# Patient Record
Sex: FEMALE | Race: WHITE | NOT HISPANIC OR LATINO | URBAN - METROPOLITAN AREA
[De-identification: names, ages, dates, MRNs, and addresses within clinical notes are randomized per-mention and may not be internally consistent; named-entity substitution may affect disease eponyms.]

---

## 2017-01-16 ENCOUNTER — ON CAMPUS - OUTPATIENT (AMBULATORY)
Dept: URBAN - METROPOLITAN AREA HOSPITAL 85 | Facility: HOSPITAL | Age: 42
End: 2017-01-16

## 2017-01-16 ENCOUNTER — HOSPITAL ENCOUNTER (OUTPATIENT)
Dept: PREOP | Facility: HOSPITAL | Age: 42
Setting detail: HOSPITAL OUTPATIENT SURGERY
Discharge: HOME | End: 2017-01-16
Attending: INTERNAL MEDICINE | Admitting: INTERNAL MEDICINE

## 2017-01-16 DIAGNOSIS — K52.9 NONINFECTIVE GASTROENTERITIS AND COLITIS, UNSPECIFIED: ICD-10-CM

## 2017-01-16 PROCEDURE — 45378 DIAGNOSTIC COLONOSCOPY: CPT | Performed by: INTERNAL MEDICINE

## 2017-03-01 ENCOUNTER — OFFICE (AMBULATORY)
Dept: URBAN - METROPOLITAN AREA CLINIC 64 | Facility: CLINIC | Age: 42
End: 2017-03-01

## 2017-03-01 VITALS — SYSTOLIC BLOOD PRESSURE: 91 MMHG | HEART RATE: 88 BPM | WEIGHT: 130 LBS | DIASTOLIC BLOOD PRESSURE: 53 MMHG

## 2017-03-01 DIAGNOSIS — K58.9 IRRITABLE BOWEL SYNDROME WITHOUT DIARRHEA: ICD-10-CM

## 2017-03-01 PROCEDURE — 99214 OFFICE O/P EST MOD 30 MIN: CPT | Performed by: NURSE PRACTITIONER

## 2017-04-24 ENCOUNTER — OFFICE (AMBULATORY)
Dept: URBAN - METROPOLITAN AREA CLINIC 64 | Facility: CLINIC | Age: 42
End: 2017-04-24

## 2017-04-24 VITALS — WEIGHT: 129 LBS | HEART RATE: 82 BPM | SYSTOLIC BLOOD PRESSURE: 100 MMHG | DIASTOLIC BLOOD PRESSURE: 61 MMHG

## 2017-04-24 DIAGNOSIS — R10.84 GENERALIZED ABDOMINAL PAIN: ICD-10-CM

## 2017-04-24 DIAGNOSIS — K59.00 CONSTIPATION, UNSPECIFIED: ICD-10-CM

## 2017-04-24 DIAGNOSIS — K58.9 IRRITABLE BOWEL SYNDROME WITHOUT DIARRHEA: ICD-10-CM

## 2017-04-24 PROCEDURE — 99213 OFFICE O/P EST LOW 20 MIN: CPT | Performed by: NURSE PRACTITIONER

## 2017-04-24 RX ORDER — SORBITOL SOLUTION 70 %
SOLUTION, ORAL MISCELLANEOUS
Qty: 100 | Refills: 0 | Status: COMPLETED
Start: 2017-04-24 | End: 2017-06-09

## 2017-04-24 RX ORDER — LINACLOTIDE 290 UG/1
290 CAPSULE, GELATIN COATED ORAL
Qty: 90 | Refills: 3 | Status: COMPLETED
Start: 2017-04-24 | End: 2017-06-09

## 2017-05-20 ENCOUNTER — INPATIENT HOSPITAL (AMBULATORY)
Dept: URBAN - METROPOLITAN AREA HOSPITAL 84 | Facility: HOSPITAL | Age: 42
End: 2017-05-20

## 2017-05-20 DIAGNOSIS — D64.9 ANEMIA, UNSPECIFIED: ICD-10-CM

## 2017-05-20 DIAGNOSIS — K52.9 NONINFECTIVE GASTROENTERITIS AND COLITIS, UNSPECIFIED: ICD-10-CM

## 2017-05-20 DIAGNOSIS — D72.829 ELEVATED WHITE BLOOD CELL COUNT, UNSPECIFIED: ICD-10-CM

## 2017-05-20 PROCEDURE — 99254 IP/OBS CNSLTJ NEW/EST MOD 60: CPT | Performed by: INTERNAL MEDICINE

## 2017-05-21 PROCEDURE — 99232 SBSQ HOSP IP/OBS MODERATE 35: CPT | Performed by: INTERNAL MEDICINE

## 2017-05-22 ENCOUNTER — INPATIENT HOSPITAL (AMBULATORY)
Dept: URBAN - METROPOLITAN AREA HOSPITAL 84 | Facility: HOSPITAL | Age: 42
End: 2017-05-22

## 2017-05-22 DIAGNOSIS — D72.829 ELEVATED WHITE BLOOD CELL COUNT, UNSPECIFIED: ICD-10-CM

## 2017-05-22 DIAGNOSIS — D64.9 ANEMIA, UNSPECIFIED: ICD-10-CM

## 2017-05-22 DIAGNOSIS — K52.9 NONINFECTIVE GASTROENTERITIS AND COLITIS, UNSPECIFIED: ICD-10-CM

## 2017-05-22 PROCEDURE — 99232 SBSQ HOSP IP/OBS MODERATE 35: CPT | Performed by: INTERNAL MEDICINE

## 2017-05-23 PROCEDURE — 99232 SBSQ HOSP IP/OBS MODERATE 35: CPT | Performed by: INTERNAL MEDICINE

## 2017-06-09 ENCOUNTER — OFFICE (AMBULATORY)
Dept: URBAN - METROPOLITAN AREA CLINIC 64 | Facility: CLINIC | Age: 42
End: 2017-06-09

## 2017-06-09 VITALS — DIASTOLIC BLOOD PRESSURE: 50 MMHG | WEIGHT: 128 LBS | HEART RATE: 92 BPM | SYSTOLIC BLOOD PRESSURE: 96 MMHG

## 2017-06-09 DIAGNOSIS — R19.7 DIARRHEA, UNSPECIFIED: ICD-10-CM

## 2017-06-09 DIAGNOSIS — R10.84 GENERALIZED ABDOMINAL PAIN: ICD-10-CM

## 2017-06-09 DIAGNOSIS — K52.9 NONINFECTIVE GASTROENTERITIS AND COLITIS, UNSPECIFIED: ICD-10-CM

## 2017-06-09 PROCEDURE — 99213 OFFICE O/P EST LOW 20 MIN: CPT | Performed by: NURSE PRACTITIONER

## 2017-07-20 ENCOUNTER — OFFICE (AMBULATORY)
Dept: URBAN - METROPOLITAN AREA CLINIC 64 | Facility: CLINIC | Age: 42
End: 2017-07-20

## 2017-07-20 VITALS — DIASTOLIC BLOOD PRESSURE: 67 MMHG | WEIGHT: 130 LBS | SYSTOLIC BLOOD PRESSURE: 98 MMHG | HEART RATE: 87 BPM

## 2017-07-20 DIAGNOSIS — K62.5 HEMORRHAGE OF ANUS AND RECTUM: ICD-10-CM

## 2017-07-20 DIAGNOSIS — R10.84 GENERALIZED ABDOMINAL PAIN: ICD-10-CM

## 2017-07-20 DIAGNOSIS — K52.9 NONINFECTIVE GASTROENTERITIS AND COLITIS, UNSPECIFIED: ICD-10-CM

## 2017-07-20 PROCEDURE — 99214 OFFICE O/P EST MOD 30 MIN: CPT | Performed by: NURSE PRACTITIONER

## 2017-07-20 RX ORDER — METRONIDAZOLE 500 MG/1
1500 TABLET, FILM COATED ORAL
Qty: 30 | Refills: 0 | Status: COMPLETED
Start: 2017-07-20 | End: 2017-10-19

## 2017-07-20 RX ORDER — MESALAMINE 250 MG/1
CAPSULE ORAL
Qty: 120 | Refills: 0 | Status: COMPLETED
Start: 2017-07-20 | End: 2017-10-19

## 2017-07-20 RX ORDER — HYDROCORTISONE ACETATE AND PRAMOXINE HYDROCHLORIDE 25; 10 MG/G; MG/G
CREAM TOPICAL
Qty: 1 | Refills: 2 | Status: COMPLETED
Start: 2017-07-20 | End: 2017-10-19

## 2017-10-19 ENCOUNTER — OFFICE (AMBULATORY)
Dept: URBAN - METROPOLITAN AREA CLINIC 64 | Facility: CLINIC | Age: 42
End: 2017-10-19

## 2017-10-19 ENCOUNTER — ON CAMPUS - OUTPATIENT (AMBULATORY)
Dept: URBAN - METROPOLITAN AREA HOSPITAL 2 | Facility: HOSPITAL | Age: 42
End: 2017-10-19

## 2017-10-19 VITALS
SYSTOLIC BLOOD PRESSURE: 82 MMHG | DIASTOLIC BLOOD PRESSURE: 71 MMHG | HEART RATE: 88 BPM | HEART RATE: 86 BPM | SYSTOLIC BLOOD PRESSURE: 78 MMHG | SYSTOLIC BLOOD PRESSURE: 105 MMHG | DIASTOLIC BLOOD PRESSURE: 119 MMHG | SYSTOLIC BLOOD PRESSURE: 84 MMHG | HEART RATE: 102 BPM | RESPIRATION RATE: 17 BRPM | RESPIRATION RATE: 20 BRPM | HEART RATE: 92 BPM | DIASTOLIC BLOOD PRESSURE: 69 MMHG | SYSTOLIC BLOOD PRESSURE: 79 MMHG | SYSTOLIC BLOOD PRESSURE: 133 MMHG | HEART RATE: 83 BPM | SYSTOLIC BLOOD PRESSURE: 99 MMHG | SYSTOLIC BLOOD PRESSURE: 90 MMHG | SYSTOLIC BLOOD PRESSURE: 111 MMHG | HEART RATE: 90 BPM | OXYGEN SATURATION: 100 % | HEART RATE: 84 BPM | HEART RATE: 78 BPM | DIASTOLIC BLOOD PRESSURE: 55 MMHG | DIASTOLIC BLOOD PRESSURE: 63 MMHG | RESPIRATION RATE: 18 BRPM | WEIGHT: 128 LBS | DIASTOLIC BLOOD PRESSURE: 60 MMHG | HEART RATE: 91 BPM | RESPIRATION RATE: 16 BRPM | OXYGEN SATURATION: 97 % | DIASTOLIC BLOOD PRESSURE: 58 MMHG | OXYGEN SATURATION: 99 % | TEMPERATURE: 97.9 F | DIASTOLIC BLOOD PRESSURE: 57 MMHG | DIASTOLIC BLOOD PRESSURE: 51 MMHG

## 2017-10-19 DIAGNOSIS — K52.9 NONINFECTIVE GASTROENTERITIS AND COLITIS, UNSPECIFIED: ICD-10-CM

## 2017-10-19 DIAGNOSIS — K62.89 OTHER SPECIFIED DISEASES OF ANUS AND RECTUM: ICD-10-CM

## 2017-10-19 DIAGNOSIS — K62.5 HEMORRHAGE OF ANUS AND RECTUM: ICD-10-CM

## 2017-10-19 DIAGNOSIS — R19.7 DIARRHEA, UNSPECIFIED: ICD-10-CM

## 2017-10-19 LAB
GI HISTOLOGY: A. UNSPECIFIED: (no result)
GI HISTOLOGY: PDF REPORT: (no result)

## 2017-10-19 PROCEDURE — 88305 TISSUE EXAM BY PATHOLOGIST: CPT | Performed by: INTERNAL MEDICINE

## 2017-10-19 PROCEDURE — 45380 COLONOSCOPY AND BIOPSY: CPT | Performed by: INTERNAL MEDICINE

## 2017-10-19 RX ADMIN — PROPOFOL: 10 INJECTION, EMULSION INTRAVENOUS at 15:04

## 2018-08-01 ENCOUNTER — OFFICE (AMBULATORY)
Dept: URBAN - METROPOLITAN AREA CLINIC 64 | Facility: CLINIC | Age: 43
End: 2018-08-01

## 2018-08-01 VITALS — HEART RATE: 80 BPM | SYSTOLIC BLOOD PRESSURE: 89 MMHG | DIASTOLIC BLOOD PRESSURE: 64 MMHG | WEIGHT: 134 LBS

## 2018-08-01 DIAGNOSIS — K58.9 IRRITABLE BOWEL SYNDROME WITHOUT DIARRHEA: ICD-10-CM

## 2018-08-01 DIAGNOSIS — K59.00 CONSTIPATION, UNSPECIFIED: ICD-10-CM

## 2018-08-01 DIAGNOSIS — R10.84 GENERALIZED ABDOMINAL PAIN: ICD-10-CM

## 2018-08-01 PROCEDURE — 99213 OFFICE O/P EST LOW 20 MIN: CPT | Performed by: NURSE PRACTITIONER

## 2018-08-01 RX ORDER — SORBITOL SOLUTION 70 %
SOLUTION, ORAL MISCELLANEOUS
Qty: 100 | Refills: 0 | Status: COMPLETED
Start: 2018-08-01 | End: 2019-04-09

## 2018-08-01 RX ORDER — PLECANATIDE 3 MG/1
3 TABLET ORAL
Qty: 90 | Refills: 3 | Status: COMPLETED
Start: 2018-08-01 | End: 2019-04-09

## 2019-04-09 ENCOUNTER — OFFICE (AMBULATORY)
Dept: URBAN - METROPOLITAN AREA CLINIC 64 | Facility: CLINIC | Age: 44
End: 2019-04-09

## 2019-04-09 VITALS — SYSTOLIC BLOOD PRESSURE: 107 MMHG | WEIGHT: 139 LBS | HEART RATE: 89 BPM | DIASTOLIC BLOOD PRESSURE: 72 MMHG

## 2019-04-09 DIAGNOSIS — R10.84 GENERALIZED ABDOMINAL PAIN: ICD-10-CM

## 2019-04-09 DIAGNOSIS — K58.9 IRRITABLE BOWEL SYNDROME WITHOUT DIARRHEA: ICD-10-CM

## 2019-04-09 DIAGNOSIS — K59.00 CONSTIPATION, UNSPECIFIED: ICD-10-CM

## 2019-04-09 PROCEDURE — 99213 OFFICE O/P EST LOW 20 MIN: CPT | Performed by: NURSE PRACTITIONER

## 2019-04-09 RX ORDER — DICYCLOMINE HYDROCHLORIDE 20 MG/1
80 TABLET ORAL
Qty: 120 | Refills: 12 | Status: COMPLETED
Start: 2019-04-09 | End: 2020-07-28

## 2019-04-09 RX ORDER — LUBIPROSTONE 8 UG/1
16 CAPSULE, GELATIN COATED ORAL
Qty: 180 | Refills: 3 | Status: COMPLETED
Start: 2019-04-09 | End: 2020-07-28

## 2019-04-17 ENCOUNTER — HOSPITAL ENCOUNTER (OUTPATIENT)
Dept: CT IMAGING | Facility: HOSPITAL | Age: 44
Discharge: HOME | End: 2019-04-17
Attending: NURSE PRACTITIONER | Admitting: NURSE PRACTITIONER

## 2019-10-26 ENCOUNTER — ANESTHESIA EVENT (OUTPATIENT)
Dept: PERIOP | Facility: HOSPITAL | Age: 44
End: 2019-10-26

## 2019-10-26 ENCOUNTER — HOSPITAL ENCOUNTER (INPATIENT)
Facility: HOSPITAL | Age: 44
LOS: 8 days | Discharge: HOME | End: 2019-11-03
Attending: INTERNAL MEDICINE | Admitting: SURGERY

## 2019-10-26 ENCOUNTER — APPOINTMENT (OUTPATIENT)
Dept: CT IMAGING | Facility: HOSPITAL | Age: 44
End: 2019-10-26

## 2019-10-26 ENCOUNTER — ANESTHESIA (OUTPATIENT)
Dept: PERIOP | Facility: HOSPITAL | Age: 44
End: 2019-10-26

## 2019-10-26 DIAGNOSIS — K65.1 INTRA-ABDOMINAL ABSCESS (HCC): ICD-10-CM

## 2019-10-26 DIAGNOSIS — R50.9 FEVER IN ADULT: ICD-10-CM

## 2019-10-26 DIAGNOSIS — D72.829 LEUKOCYTOSIS, UNSPECIFIED TYPE: ICD-10-CM

## 2019-10-26 DIAGNOSIS — R10.9 ABDOMINAL PAIN: ICD-10-CM

## 2019-10-26 DIAGNOSIS — R10.84 GENERALIZED ABDOMINAL PAIN: Primary | ICD-10-CM

## 2019-10-26 DIAGNOSIS — D64.9 ANEMIA, UNSPECIFIED TYPE: ICD-10-CM

## 2019-10-26 LAB
ABO GROUP BLD: NORMAL
ALBUMIN SERPL-MCNC: 3.7 G/DL (ref 3.5–5.2)
ALBUMIN/GLOB SERPL: 0.8 G/DL
ALP SERPL-CCNC: 198 U/L (ref 39–117)
ALT SERPL W P-5'-P-CCNC: 36 U/L (ref 1–33)
ANION GAP SERPL CALCULATED.3IONS-SCNC: 11 MMOL/L (ref 5–15)
ANION GAP SERPL CALCULATED.3IONS-SCNC: 13 MMOL/L (ref 5–15)
AST SERPL-CCNC: 48 U/L (ref 1–32)
B-HCG UR QL: NEGATIVE
BASOPHILS # BLD AUTO: 0 10*3/MM3 (ref 0–0.2)
BASOPHILS # BLD AUTO: 0 10*3/MM3 (ref 0–0.2)
BASOPHILS NFR BLD AUTO: 0.1 % (ref 0–1.5)
BASOPHILS NFR BLD AUTO: 0.1 % (ref 0–1.5)
BILIRUB SERPL-MCNC: 1 MG/DL (ref 0.2–1.2)
BILIRUB UR QL STRIP: ABNORMAL
BLD GP AB SCN SERPL QL: NEGATIVE
BUN BLD-MCNC: 11 MG/DL (ref 6–20)
BUN BLD-MCNC: 9 MG/DL (ref 6–20)
BUN/CREAT SERPL: 12.2 (ref 7–25)
BUN/CREAT SERPL: 12.8 (ref 7–25)
CALCIUM SPEC-SCNC: 8.1 MG/DL (ref 8.6–10.5)
CALCIUM SPEC-SCNC: 9.2 MG/DL (ref 8.6–10.5)
CHLORIDE SERPL-SCNC: 101 MMOL/L (ref 98–107)
CHLORIDE SERPL-SCNC: 109 MMOL/L (ref 98–107)
CLARITY UR: CLEAR
CO2 SERPL-SCNC: 20 MMOL/L (ref 22–29)
CO2 SERPL-SCNC: 21 MMOL/L (ref 22–29)
COLOR UR: YELLOW
CREAT BLD-MCNC: 0.74 MG/DL (ref 0.57–1)
CREAT BLD-MCNC: 0.86 MG/DL (ref 0.57–1)
D-LACTATE SERPL-SCNC: 1 MMOL/L (ref 0.5–2)
DEPRECATED RDW RBC AUTO: 45.1 FL (ref 37–54)
DEPRECATED RDW RBC AUTO: 45.5 FL (ref 37–54)
EOSINOPHIL # BLD AUTO: 0 10*3/MM3 (ref 0–0.4)
EOSINOPHIL # BLD AUTO: 0 10*3/MM3 (ref 0–0.4)
EOSINOPHIL NFR BLD AUTO: 0 % (ref 0.3–6.2)
EOSINOPHIL NFR BLD AUTO: 0.1 % (ref 0.3–6.2)
ERYTHROCYTE [DISTWIDTH] IN BLOOD BY AUTOMATED COUNT: 14.9 % (ref 12.3–15.4)
ERYTHROCYTE [DISTWIDTH] IN BLOOD BY AUTOMATED COUNT: 15.1 % (ref 12.3–15.4)
GFR SERPL CREATININE-BSD FRML MDRD: 72 ML/MIN/1.73
GFR SERPL CREATININE-BSD FRML MDRD: 85 ML/MIN/1.73
GLOBULIN UR ELPH-MCNC: 4.4 GM/DL
GLUCOSE BLD-MCNC: 114 MG/DL (ref 65–99)
GLUCOSE BLD-MCNC: 154 MG/DL (ref 65–99)
GLUCOSE UR STRIP-MCNC: NEGATIVE MG/DL
HCT VFR BLD AUTO: 25.4 % (ref 34–46.6)
HCT VFR BLD AUTO: 27.2 % (ref 34–46.6)
HGB BLD-MCNC: 8.3 G/DL (ref 12–15.9)
HGB BLD-MCNC: 9.5 G/DL (ref 12–15.9)
HGB UR QL STRIP.AUTO: NEGATIVE
KETONES UR QL STRIP: NEGATIVE
LEUKOCYTE ESTERASE UR QL STRIP.AUTO: NEGATIVE
LIPASE SERPL-CCNC: 16 U/L (ref 13–60)
LYMPHOCYTES # BLD AUTO: 0.5 10*3/MM3 (ref 0.7–3.1)
LYMPHOCYTES # BLD AUTO: 1.3 10*3/MM3 (ref 0.7–3.1)
LYMPHOCYTES NFR BLD AUTO: 3.8 % (ref 19.6–45.3)
LYMPHOCYTES NFR BLD AUTO: 6.6 % (ref 19.6–45.3)
MCH RBC QN AUTO: 27.9 PG (ref 26.6–33)
MCH RBC QN AUTO: 29.8 PG (ref 26.6–33)
MCHC RBC AUTO-ENTMCNC: 32.6 G/DL (ref 31.5–35.7)
MCHC RBC AUTO-ENTMCNC: 35 G/DL (ref 31.5–35.7)
MCV RBC AUTO: 85.2 FL (ref 79–97)
MCV RBC AUTO: 85.6 FL (ref 79–97)
MONOCYTES # BLD AUTO: 0.7 10*3/MM3 (ref 0.1–0.9)
MONOCYTES # BLD AUTO: 1 10*3/MM3 (ref 0.1–0.9)
MONOCYTES NFR BLD AUTO: 4.9 % (ref 5–12)
MONOCYTES NFR BLD AUTO: 5.1 % (ref 5–12)
NEUTROPHILS # BLD AUTO: 12.8 10*3/MM3 (ref 1.7–7)
NEUTROPHILS # BLD AUTO: 17.1 10*3/MM3 (ref 1.7–7)
NEUTROPHILS NFR BLD AUTO: 88.2 % (ref 42.7–76)
NEUTROPHILS NFR BLD AUTO: 91.1 % (ref 42.7–76)
NITRITE UR QL STRIP: NEGATIVE
NRBC BLD AUTO-RTO: 0 /100 WBC (ref 0–0.2)
NRBC BLD AUTO-RTO: 0.1 /100 WBC (ref 0–0.2)
PH UR STRIP.AUTO: 6.5 [PH] (ref 5–8)
PLATELET # BLD AUTO: 339 10*3/MM3 (ref 140–450)
PLATELET # BLD AUTO: 354 10*3/MM3 (ref 140–450)
PMV BLD AUTO: 7.7 FL (ref 6–12)
PMV BLD AUTO: 8 FL (ref 6–12)
POTASSIUM BLD-SCNC: 2.9 MMOL/L (ref 3.5–5.2)
POTASSIUM BLD-SCNC: 3.5 MMOL/L (ref 3.5–5.2)
PROT SERPL-MCNC: 8.1 G/DL (ref 6–8.5)
PROT UR QL STRIP: ABNORMAL
RBC # BLD AUTO: 2.97 10*6/MM3 (ref 3.77–5.28)
RBC # BLD AUTO: 3.19 10*6/MM3 (ref 3.77–5.28)
RH BLD: POSITIVE
SODIUM BLD-SCNC: 135 MMOL/L (ref 136–145)
SODIUM BLD-SCNC: 140 MMOL/L (ref 136–145)
SP GR UR STRIP: 1.03 (ref 1–1.03)
T&S EXPIRATION DATE: NORMAL
UROBILINOGEN UR QL STRIP: ABNORMAL
WBC NRBC COR # BLD: 14.1 10*3/MM3 (ref 3.4–10.8)
WBC NRBC COR # BLD: 19.4 10*3/MM3 (ref 3.4–10.8)

## 2019-10-26 PROCEDURE — 81025 URINE PREGNANCY TEST: CPT | Performed by: NURSE PRACTITIONER

## 2019-10-26 PROCEDURE — 25010000002 FENTANYL CITRATE (PF) 100 MCG/2ML SOLUTION: Performed by: STUDENT IN AN ORGANIZED HEALTH CARE EDUCATION/TRAINING PROGRAM

## 2019-10-26 PROCEDURE — 25010000002 MORPHINE PER 10 MG: Performed by: NURSE PRACTITIONER

## 2019-10-26 PROCEDURE — G0378 HOSPITAL OBSERVATION PER HR: HCPCS

## 2019-10-26 PROCEDURE — 88305 TISSUE EXAM BY PATHOLOGIST: CPT | Performed by: SURGERY

## 2019-10-26 PROCEDURE — 25010000002 LORAZEPAM PER 2 MG: Performed by: STUDENT IN AN ORGANIZED HEALTH CARE EDUCATION/TRAINING PROGRAM

## 2019-10-26 PROCEDURE — 25010000002 PIPERACILLIN SOD-TAZOBACTAM PER 1 G: Performed by: NURSE PRACTITIONER

## 2019-10-26 PROCEDURE — 58700 REMOVAL OF FALLOPIAN TUBE: CPT | Performed by: PHYSICIAN ASSISTANT

## 2019-10-26 PROCEDURE — 87186 SC STD MICRODIL/AGAR DIL: CPT | Performed by: SURGERY

## 2019-10-26 PROCEDURE — 83690 ASSAY OF LIPASE: CPT | Performed by: NURSE PRACTITIONER

## 2019-10-26 PROCEDURE — 0W9H0ZZ DRAINAGE OF RETROPERITONEUM, OPEN APPROACH: ICD-10-PCS | Performed by: SURGERY

## 2019-10-26 PROCEDURE — 25010000002 ONDANSETRON PER 1 MG: Performed by: NURSE PRACTITIONER

## 2019-10-26 PROCEDURE — 25010000002 ONDANSETRON PER 1 MG: Performed by: INTERNAL MEDICINE

## 2019-10-26 PROCEDURE — 87070 CULTURE OTHR SPECIMN AEROBIC: CPT | Performed by: SURGERY

## 2019-10-26 PROCEDURE — 49020 DRAINAGE ABDOM ABSCESS OPEN: CPT | Performed by: SURGERY

## 2019-10-26 PROCEDURE — 74177 CT ABD & PELVIS W/CONTRAST: CPT

## 2019-10-26 PROCEDURE — 80053 COMPREHEN METABOLIC PANEL: CPT | Performed by: NURSE PRACTITIONER

## 2019-10-26 PROCEDURE — 99223 1ST HOSP IP/OBS HIGH 75: CPT | Performed by: INTERNAL MEDICINE

## 2019-10-26 PROCEDURE — 49020 DRAINAGE ABDOM ABSCESS OPEN: CPT | Performed by: PHYSICIAN ASSISTANT

## 2019-10-26 PROCEDURE — 87075 CULTR BACTERIA EXCEPT BLOOD: CPT | Performed by: SURGERY

## 2019-10-26 PROCEDURE — 0 IOPAMIDOL PER 1 ML: Performed by: NURSE PRACTITIONER

## 2019-10-26 PROCEDURE — 86850 RBC ANTIBODY SCREEN: CPT | Performed by: SURGERY

## 2019-10-26 PROCEDURE — 87040 BLOOD CULTURE FOR BACTERIA: CPT | Performed by: NURSE PRACTITIONER

## 2019-10-26 PROCEDURE — 85025 COMPLETE CBC W/AUTO DIFF WBC: CPT | Performed by: NURSE PRACTITIONER

## 2019-10-26 PROCEDURE — 58700 REMOVAL OF FALLOPIAN TUBE: CPT | Performed by: SURGERY

## 2019-10-26 PROCEDURE — 0UT50ZZ RESECTION OF RIGHT FALLOPIAN TUBE, OPEN APPROACH: ICD-10-PCS | Performed by: SURGERY

## 2019-10-26 PROCEDURE — 81003 URINALYSIS AUTO W/O SCOPE: CPT | Performed by: NURSE PRACTITIONER

## 2019-10-26 PROCEDURE — 25010000002 DEXAMETHASONE PER 1 MG: Performed by: STUDENT IN AN ORGANIZED HEALTH CARE EDUCATION/TRAINING PROGRAM

## 2019-10-26 PROCEDURE — 25010000002 MORPHINE PER 10 MG: Performed by: INTERNAL MEDICINE

## 2019-10-26 PROCEDURE — 99284 EMERGENCY DEPT VISIT MOD MDM: CPT

## 2019-10-26 PROCEDURE — 86901 BLOOD TYPING SEROLOGIC RH(D): CPT | Performed by: SURGERY

## 2019-10-26 PROCEDURE — 85025 COMPLETE CBC W/AUTO DIFF WBC: CPT | Performed by: INTERNAL MEDICINE

## 2019-10-26 PROCEDURE — 86900 BLOOD TYPING SEROLOGIC ABO: CPT

## 2019-10-26 PROCEDURE — 86900 BLOOD TYPING SEROLOGIC ABO: CPT | Performed by: SURGERY

## 2019-10-26 PROCEDURE — 0D9W0ZZ DRAINAGE OF PERITONEUM, OPEN APPROACH: ICD-10-PCS | Performed by: SURGERY

## 2019-10-26 PROCEDURE — 25010000002 PROPOFOL 10 MG/ML EMULSION: Performed by: STUDENT IN AN ORGANIZED HEALTH CARE EDUCATION/TRAINING PROGRAM

## 2019-10-26 PROCEDURE — 83605 ASSAY OF LACTIC ACID: CPT

## 2019-10-26 PROCEDURE — 87205 SMEAR GRAM STAIN: CPT | Performed by: SURGERY

## 2019-10-26 PROCEDURE — 25010000002 SUCCINYLCHOLINE PER 20 MG: Performed by: STUDENT IN AN ORGANIZED HEALTH CARE EDUCATION/TRAINING PROGRAM

## 2019-10-26 PROCEDURE — 80048 BASIC METABOLIC PNL TOTAL CA: CPT | Performed by: INTERNAL MEDICINE

## 2019-10-26 PROCEDURE — 87015 SPECIMEN INFECT AGNT CONCNTJ: CPT | Performed by: SURGERY

## 2019-10-26 PROCEDURE — 25010000002 HYDROMORPHONE PER 4 MG: Performed by: STUDENT IN AN ORGANIZED HEALTH CARE EDUCATION/TRAINING PROGRAM

## 2019-10-26 PROCEDURE — 86901 BLOOD TYPING SEROLOGIC RH(D): CPT

## 2019-10-26 PROCEDURE — 25010000002 KETOROLAC TROMETHAMINE PER 15 MG: Performed by: STUDENT IN AN ORGANIZED HEALTH CARE EDUCATION/TRAINING PROGRAM

## 2019-10-26 PROCEDURE — 25010000002 PIPERACILLIN SOD-TAZOBACTAM PER 1 G: Performed by: INTERNAL MEDICINE

## 2019-10-26 PROCEDURE — 99255 IP/OBS CONSLTJ NEW/EST HI 80: CPT | Performed by: SURGERY

## 2019-10-26 RX ORDER — HYDROCODONE BITARTRATE AND ACETAMINOPHEN 10; 325 MG/1; MG/1
1 TABLET ORAL EVERY 4 HOURS PRN
Status: DISCONTINUED | OUTPATIENT
Start: 2019-10-26 | End: 2019-11-03 | Stop reason: HOSPADM

## 2019-10-26 RX ORDER — ONDANSETRON 2 MG/ML
4 INJECTION INTRAMUSCULAR; INTRAVENOUS EVERY 6 HOURS PRN
Status: DISCONTINUED | OUTPATIENT
Start: 2019-10-26 | End: 2019-11-03 | Stop reason: HOSPADM

## 2019-10-26 RX ORDER — NEOSTIGMINE METHYLSULFATE 5 MG/5 ML
SYRINGE (ML) INTRAVENOUS AS NEEDED
Status: DISCONTINUED | OUTPATIENT
Start: 2019-10-26 | End: 2019-10-26 | Stop reason: SURG

## 2019-10-26 RX ORDER — SODIUM CHLORIDE 0.9 % (FLUSH) 0.9 %
10 SYRINGE (ML) INJECTION AS NEEDED
Status: DISCONTINUED | OUTPATIENT
Start: 2019-10-26 | End: 2019-10-28 | Stop reason: ALTCHOICE

## 2019-10-26 RX ORDER — DEXTROSE AND SODIUM CHLORIDE 5; .45 G/100ML; G/100ML
125 INJECTION, SOLUTION INTRAVENOUS CONTINUOUS
Status: DISCONTINUED | OUTPATIENT
Start: 2019-10-26 | End: 2019-10-28 | Stop reason: ALTCHOICE

## 2019-10-26 RX ORDER — ACETAMINOPHEN 650 MG/1
650 SUPPOSITORY RECTAL EVERY 4 HOURS PRN
Status: DISCONTINUED | OUTPATIENT
Start: 2019-10-26 | End: 2019-11-03 | Stop reason: HOSPADM

## 2019-10-26 RX ORDER — HYDROMORPHONE HCL 110MG/55ML
0.5 PATIENT CONTROLLED ANALGESIA SYRINGE INTRAVENOUS
Status: DISCONTINUED | OUTPATIENT
Start: 2019-10-26 | End: 2019-10-26 | Stop reason: HOSPADM

## 2019-10-26 RX ORDER — HYDROMORPHONE HCL 110MG/55ML
1 PATIENT CONTROLLED ANALGESIA SYRINGE INTRAVENOUS
Status: DISCONTINUED | OUTPATIENT
Start: 2019-10-26 | End: 2019-10-27

## 2019-10-26 RX ORDER — ONDANSETRON 4 MG/1
4 TABLET, FILM COATED ORAL EVERY 6 HOURS PRN
Status: DISCONTINUED | OUTPATIENT
Start: 2019-10-26 | End: 2019-10-26

## 2019-10-26 RX ORDER — ONDANSETRON 2 MG/ML
4 INJECTION INTRAMUSCULAR; INTRAVENOUS EVERY 6 HOURS PRN
Status: DISCONTINUED | OUTPATIENT
Start: 2019-10-26 | End: 2019-10-26

## 2019-10-26 RX ORDER — BISACODYL 5 MG/1
10 TABLET, DELAYED RELEASE ORAL DAILY PRN
Status: DISCONTINUED | OUTPATIENT
Start: 2019-10-26 | End: 2019-11-02

## 2019-10-26 RX ORDER — ACETAMINOPHEN 500 MG
1000 TABLET ORAL ONCE
Status: COMPLETED | OUTPATIENT
Start: 2019-10-26 | End: 2019-10-26

## 2019-10-26 RX ORDER — CHOLECALCIFEROL (VITAMIN D3) 125 MCG
5 CAPSULE ORAL NIGHTLY PRN
Status: DISCONTINUED | OUTPATIENT
Start: 2019-10-26 | End: 2019-11-03 | Stop reason: HOSPADM

## 2019-10-26 RX ORDER — PROMETHAZINE HYDROCHLORIDE 25 MG/ML
12.5 INJECTION, SOLUTION INTRAMUSCULAR; INTRAVENOUS EVERY 6 HOURS PRN
Status: DISCONTINUED | OUTPATIENT
Start: 2019-10-26 | End: 2019-11-03 | Stop reason: HOSPADM

## 2019-10-26 RX ORDER — MORPHINE SULFATE 4 MG/ML
2 INJECTION, SOLUTION INTRAMUSCULAR; INTRAVENOUS
Status: DISCONTINUED | OUTPATIENT
Start: 2019-10-26 | End: 2019-10-26

## 2019-10-26 RX ORDER — SODIUM CHLORIDE 9 MG/ML
100 INJECTION, SOLUTION INTRAVENOUS CONTINUOUS
Status: DISCONTINUED | OUTPATIENT
Start: 2019-10-26 | End: 2019-10-26

## 2019-10-26 RX ORDER — PROPOFOL 10 MG/ML
VIAL (ML) INTRAVENOUS AS NEEDED
Status: DISCONTINUED | OUTPATIENT
Start: 2019-10-26 | End: 2019-10-26 | Stop reason: SURG

## 2019-10-26 RX ORDER — GLYCOPYRROLATE 1 MG/5 ML
SYRINGE (ML) INTRAVENOUS AS NEEDED
Status: DISCONTINUED | OUTPATIENT
Start: 2019-10-26 | End: 2019-10-26 | Stop reason: SURG

## 2019-10-26 RX ORDER — SUCCINYLCHOLINE CHLORIDE 20 MG/ML
INJECTION INTRAMUSCULAR; INTRAVENOUS AS NEEDED
Status: DISCONTINUED | OUTPATIENT
Start: 2019-10-26 | End: 2019-10-26 | Stop reason: SURG

## 2019-10-26 RX ORDER — LORAZEPAM 2 MG/ML
INJECTION INTRAMUSCULAR AS NEEDED
Status: DISCONTINUED | OUTPATIENT
Start: 2019-10-26 | End: 2019-10-26 | Stop reason: SURG

## 2019-10-26 RX ORDER — HYDROCODONE BITARTRATE AND ACETAMINOPHEN 5; 325 MG/1; MG/1
1 TABLET ORAL EVERY 4 HOURS PRN
Status: DISCONTINUED | OUTPATIENT
Start: 2019-10-26 | End: 2019-11-03 | Stop reason: HOSPADM

## 2019-10-26 RX ORDER — ROCURONIUM BROMIDE 10 MG/ML
INJECTION, SOLUTION INTRAVENOUS AS NEEDED
Status: DISCONTINUED | OUTPATIENT
Start: 2019-10-26 | End: 2019-10-26 | Stop reason: SURG

## 2019-10-26 RX ORDER — NALOXONE HCL 0.4 MG/ML
0.1 VIAL (ML) INJECTION
Status: DISCONTINUED | OUTPATIENT
Start: 2019-10-26 | End: 2019-10-27

## 2019-10-26 RX ORDER — NALOXONE HCL 0.4 MG/ML
0.4 VIAL (ML) INJECTION
Status: DISCONTINUED | OUTPATIENT
Start: 2019-10-26 | End: 2019-11-03 | Stop reason: HOSPADM

## 2019-10-26 RX ORDER — MAGNESIUM SULFATE HEPTAHYDRATE 40 MG/ML
4 INJECTION, SOLUTION INTRAVENOUS AS NEEDED
Status: DISCONTINUED | OUTPATIENT
Start: 2019-10-26 | End: 2019-11-01

## 2019-10-26 RX ORDER — FENTANYL CITRATE 50 UG/ML
INJECTION, SOLUTION INTRAMUSCULAR; INTRAVENOUS AS NEEDED
Status: DISCONTINUED | OUTPATIENT
Start: 2019-10-26 | End: 2019-10-26 | Stop reason: SURG

## 2019-10-26 RX ORDER — ACETAMINOPHEN 325 MG/1
650 TABLET ORAL EVERY 4 HOURS PRN
Status: DISCONTINUED | OUTPATIENT
Start: 2019-10-26 | End: 2019-11-03 | Stop reason: HOSPADM

## 2019-10-26 RX ORDER — KETOROLAC TROMETHAMINE 30 MG/ML
INJECTION, SOLUTION INTRAMUSCULAR; INTRAVENOUS AS NEEDED
Status: DISCONTINUED | OUTPATIENT
Start: 2019-10-26 | End: 2019-10-26 | Stop reason: SURG

## 2019-10-26 RX ORDER — FAMOTIDINE 10 MG/ML
20 INJECTION, SOLUTION INTRAVENOUS EVERY 12 HOURS SCHEDULED
Status: DISCONTINUED | OUTPATIENT
Start: 2019-10-26 | End: 2019-11-03 | Stop reason: HOSPADM

## 2019-10-26 RX ORDER — SODIUM CHLORIDE 0.9 % (FLUSH) 0.9 %
10 SYRINGE (ML) INJECTION AS NEEDED
Status: DISCONTINUED | OUTPATIENT
Start: 2019-10-26 | End: 2019-11-03 | Stop reason: HOSPADM

## 2019-10-26 RX ORDER — DEXAMETHASONE SODIUM PHOSPHATE 4 MG/ML
INJECTION, SOLUTION INTRA-ARTICULAR; INTRALESIONAL; INTRAMUSCULAR; INTRAVENOUS; SOFT TISSUE AS NEEDED
Status: DISCONTINUED | OUTPATIENT
Start: 2019-10-26 | End: 2019-10-26 | Stop reason: SURG

## 2019-10-26 RX ORDER — MORPHINE SULFATE 4 MG/ML
4 INJECTION, SOLUTION INTRAMUSCULAR; INTRAVENOUS ONCE
Status: COMPLETED | OUTPATIENT
Start: 2019-10-26 | End: 2019-10-26

## 2019-10-26 RX ORDER — POTASSIUM CHLORIDE 20 MEQ/1
40 TABLET, EXTENDED RELEASE ORAL ONCE
Status: COMPLETED | OUTPATIENT
Start: 2019-10-26 | End: 2019-10-26

## 2019-10-26 RX ORDER — MORPHINE SULFATE 4 MG/ML
4 INJECTION, SOLUTION INTRAMUSCULAR; INTRAVENOUS
Status: DISCONTINUED | OUTPATIENT
Start: 2019-10-26 | End: 2019-10-27

## 2019-10-26 RX ORDER — MAGNESIUM SULFATE HEPTAHYDRATE 40 MG/ML
2 INJECTION, SOLUTION INTRAVENOUS AS NEEDED
Status: DISCONTINUED | OUTPATIENT
Start: 2019-10-26 | End: 2019-11-01

## 2019-10-26 RX ORDER — ONDANSETRON 2 MG/ML
4 INJECTION INTRAMUSCULAR; INTRAVENOUS ONCE
Status: COMPLETED | OUTPATIENT
Start: 2019-10-26 | End: 2019-10-26

## 2019-10-26 RX ORDER — DOCUSATE SODIUM 100 MG/1
100 CAPSULE, LIQUID FILLED ORAL 2 TIMES DAILY PRN
Status: DISCONTINUED | OUTPATIENT
Start: 2019-10-26 | End: 2019-11-03 | Stop reason: HOSPADM

## 2019-10-26 RX ORDER — ACETAMINOPHEN 160 MG/5ML
650 SOLUTION ORAL EVERY 4 HOURS PRN
Status: DISCONTINUED | OUTPATIENT
Start: 2019-10-26 | End: 2019-11-03 | Stop reason: HOSPADM

## 2019-10-26 RX ORDER — PAROXETINE HYDROCHLORIDE 40 MG/1
40 TABLET, FILM COATED ORAL EVERY MORNING
COMMUNITY

## 2019-10-26 RX ORDER — POTASSIUM CHLORIDE 20 MEQ/1
40 TABLET, EXTENDED RELEASE ORAL AS NEEDED
Status: DISCONTINUED | OUTPATIENT
Start: 2019-10-26 | End: 2019-11-03 | Stop reason: HOSPADM

## 2019-10-26 RX ORDER — SODIUM CHLORIDE 0.9 % (FLUSH) 0.9 %
10 SYRINGE (ML) INJECTION EVERY 12 HOURS SCHEDULED
Status: DISCONTINUED | OUTPATIENT
Start: 2019-10-26 | End: 2019-11-03 | Stop reason: HOSPADM

## 2019-10-26 RX ORDER — SODIUM CHLORIDE 0.9 % (FLUSH) 0.9 %
3 SYRINGE (ML) INJECTION EVERY 12 HOURS SCHEDULED
Status: DISCONTINUED | OUTPATIENT
Start: 2019-10-26 | End: 2019-11-03 | Stop reason: HOSPADM

## 2019-10-26 RX ORDER — SODIUM CHLORIDE 0.9 % (FLUSH) 0.9 %
3-10 SYRINGE (ML) INJECTION AS NEEDED
Status: DISCONTINUED | OUTPATIENT
Start: 2019-10-26 | End: 2019-11-03 | Stop reason: HOSPADM

## 2019-10-26 RX ORDER — ONDANSETRON 4 MG/1
4 TABLET, FILM COATED ORAL EVERY 6 HOURS PRN
Status: DISCONTINUED | OUTPATIENT
Start: 2019-10-26 | End: 2019-11-03 | Stop reason: HOSPADM

## 2019-10-26 RX ORDER — HYDROMORPHONE HCL 110MG/55ML
PATIENT CONTROLLED ANALGESIA SYRINGE INTRAVENOUS AS NEEDED
Status: DISCONTINUED | OUTPATIENT
Start: 2019-10-26 | End: 2019-10-26 | Stop reason: SURG

## 2019-10-26 RX ORDER — PAROXETINE HYDROCHLORIDE 20 MG/1
40 TABLET, FILM COATED ORAL DAILY
Status: DISCONTINUED | OUTPATIENT
Start: 2019-10-27 | End: 2019-11-03 | Stop reason: HOSPADM

## 2019-10-26 RX ORDER — POTASSIUM CHLORIDE 1.5 G/1.77G
40 POWDER, FOR SOLUTION ORAL AS NEEDED
Status: DISCONTINUED | OUTPATIENT
Start: 2019-10-26 | End: 2019-11-03 | Stop reason: HOSPADM

## 2019-10-26 RX ADMIN — SODIUM CHLORIDE 1000 ML: 900 INJECTION, SOLUTION INTRAVENOUS at 08:27

## 2019-10-26 RX ADMIN — DEXAMETHASONE SODIUM PHOSPHATE 4 MG: 4 INJECTION, SOLUTION INTRAMUSCULAR; INTRAVENOUS at 18:10

## 2019-10-26 RX ADMIN — FENTANYL CITRATE 50 MCG: 50 INJECTION, SOLUTION INTRAMUSCULAR; INTRAVENOUS at 17:55

## 2019-10-26 RX ADMIN — Medication 10 ML: at 23:02

## 2019-10-26 RX ADMIN — ACETAMINOPHEN 1000 MG: 500 TABLET, FILM COATED ORAL at 08:38

## 2019-10-26 RX ADMIN — MORPHINE SULFATE 4 MG: 4 INJECTION INTRAVENOUS at 08:34

## 2019-10-26 RX ADMIN — POTASSIUM CHLORIDE 40 MEQ: 1500 TABLET, EXTENDED RELEASE ORAL at 12:40

## 2019-10-26 RX ADMIN — ONDANSETRON 4 MG: 2 INJECTION INTRAMUSCULAR; INTRAVENOUS at 08:28

## 2019-10-26 RX ADMIN — SUCCINYLCHOLINE CHLORIDE 130 MG: 20 INJECTION, SOLUTION INTRAMUSCULAR; INTRAVENOUS at 18:00

## 2019-10-26 RX ADMIN — DOXYCYCLINE 100 MG: 100 INJECTION, POWDER, LYOPHILIZED, FOR SOLUTION INTRAVENOUS at 12:41

## 2019-10-26 RX ADMIN — Medication 3 MG: at 18:49

## 2019-10-26 RX ADMIN — LORAZEPAM 1 MG: 2 INJECTION, SOLUTION INTRAMUSCULAR; INTRAVENOUS at 17:45

## 2019-10-26 RX ADMIN — HYDROMORPHONE HYDROCHLORIDE 0.5 MG: 2 INJECTION INTRAMUSCULAR; INTRAVENOUS; SUBCUTANEOUS at 17:54

## 2019-10-26 RX ADMIN — PIPERACILLIN AND TAZOBACTAM 3.38 G: 3; .375 INJECTION, POWDER, LYOPHILIZED, FOR SOLUTION INTRAVENOUS at 15:11

## 2019-10-26 RX ADMIN — IOPAMIDOL 100 ML: 755 INJECTION, SOLUTION INTRAVENOUS at 09:21

## 2019-10-26 RX ADMIN — MORPHINE SULFATE 2 MG: 4 INJECTION INTRAVENOUS at 12:04

## 2019-10-26 RX ADMIN — KETOROLAC TROMETHAMINE 60 MG: 30 INJECTION, SOLUTION INTRAMUSCULAR at 18:49

## 2019-10-26 RX ADMIN — DEXTROSE MONOHYDRATE AND SODIUM CHLORIDE 125 ML/HR: 5; .45 INJECTION, SOLUTION INTRAVENOUS at 23:45

## 2019-10-26 RX ADMIN — SODIUM CHLORIDE 1836 ML: 0.9 INJECTION, SOLUTION INTRAVENOUS at 08:41

## 2019-10-26 RX ADMIN — FAMOTIDINE 20 MG: 10 INJECTION, SOLUTION INTRAVENOUS at 23:00

## 2019-10-26 RX ADMIN — ONDANSETRON 4 MG: 2 INJECTION INTRAMUSCULAR; INTRAVENOUS at 18:49

## 2019-10-26 RX ADMIN — LORAZEPAM 1 MG: 2 INJECTION, SOLUTION INTRAMUSCULAR; INTRAVENOUS at 17:35

## 2019-10-26 RX ADMIN — ROCURONIUM BROMIDE 30 MG: 10 INJECTION, SOLUTION INTRAVENOUS at 18:15

## 2019-10-26 RX ADMIN — POTASSIUM CHLORIDE 40 MEQ: 1500 TABLET, EXTENDED RELEASE ORAL at 09:31

## 2019-10-26 RX ADMIN — Medication 0.6 MG: at 18:49

## 2019-10-26 RX ADMIN — PIPERACILLIN AND TAZOBACTAM 3.38 G: 3; .375 INJECTION, POWDER, LYOPHILIZED, FOR SOLUTION INTRAVENOUS at 22:59

## 2019-10-26 RX ADMIN — MORPHINE SULFATE 2 MG: 4 INJECTION INTRAVENOUS at 15:21

## 2019-10-26 RX ADMIN — PIPERACILLIN AND TAZOBACTAM 3.38 G: 3; .375 INJECTION, POWDER, LYOPHILIZED, FOR SOLUTION INTRAVENOUS at 09:59

## 2019-10-26 RX ADMIN — FAMOTIDINE 20 MG: 10 INJECTION, SOLUTION INTRAVENOUS at 12:41

## 2019-10-26 RX ADMIN — SODIUM CHLORIDE 100 ML/HR: 900 INJECTION, SOLUTION INTRAVENOUS at 11:44

## 2019-10-26 RX ADMIN — Medication 3 ML: at 23:02

## 2019-10-26 RX ADMIN — PROPOFOL 150 MG: 10 INJECTION, EMULSION INTRAVENOUS at 18:00

## 2019-10-26 RX ADMIN — Medication 10 ML: at 11:48

## 2019-10-26 NOTE — ANESTHESIA PROCEDURE NOTES
Airway  Urgency: elective    Date/Time: 10/26/2019 6:01 PM  Airway not difficult    General Information and Staff    Patient location during procedure: OR  Anesthesiologist: David Gipson MD    Indications and Patient Condition  Indications for airway management: CNS depression and airway protection    Preoxygenated: yes  MILS maintained throughout  Mask difficulty assessment: 0 - not attempted    Final Airway Details  Final airway type: endotracheal airway      Successful airway: ETT    Successful intubation technique: direct laryngoscopy  Endotracheal tube insertion site: oral  Blade: Reuben  Blade size: 3  ETT size (mm): 7.5  Cormack-Lehane Classification: grade I - full view of glottis  Placement verified by: chest auscultation, capnometry and palpation of cuff   Measured from: teeth  ETT/EBT  to teeth (cm): 22  Number of attempts at approach: 1  Assessment: lips, teeth, and gum same as pre-op and atraumatic intubation

## 2019-10-26 NOTE — ANESTHESIA PREPROCEDURE EVALUATION
Anesthesia Evaluation     Patient summary reviewed and Nursing notes reviewed   no history of anesthetic complications:  NPO Solid Status: > 8 hours  NPO Liquid Status: > 2 hours           Airway   Mallampati: I  TM distance: >3 FB  Neck ROM: full  No difficulty expected  Dental - normal exam     Pulmonary - normal exam   (+) a smoker Current Abstained day of surgery,   Cardiovascular - negative cardio ROS and normal exam        Neuro/Psych  (+) psychiatric history Depression,     GI/Hepatic/Renal/Endo    (+)  GERD, PUD,      Musculoskeletal (-) negative ROS    Abdominal  - normal exam   Substance History - negative use     OB/GYN negative ob/gyn ROS         Other - negative ROS                       Anesthesia Plan    ASA 2 - emergent     general     intravenous induction   Anesthetic plan, all risks, benefits, and alternatives have been provided, discussed and informed consent has been obtained with: patient.  Use of blood products discussed with patient  Consented to blood products.

## 2019-10-27 ENCOUNTER — APPOINTMENT (OUTPATIENT)
Dept: CT IMAGING | Facility: HOSPITAL | Age: 44
End: 2019-10-27

## 2019-10-27 ENCOUNTER — APPOINTMENT (OUTPATIENT)
Dept: GENERAL RADIOLOGY | Facility: HOSPITAL | Age: 44
End: 2019-10-27

## 2019-10-27 LAB
ANION GAP SERPL CALCULATED.3IONS-SCNC: 9 MMOL/L (ref 5–15)
ARTERIAL PATENCY WRIST A: POSITIVE
ATMOSPHERIC PRESS: ABNORMAL MM[HG]
B PERT DNA SPEC QL NAA+PROBE: NOT DETECTED
BASE EXCESS BLDA CALC-SCNC: -7.4 MMOL/L (ref 0–3)
BASOPHILS # BLD AUTO: 0 10*3/MM3 (ref 0–0.2)
BASOPHILS NFR BLD AUTO: 0.2 % (ref 0–1.5)
BDY SITE: ABNORMAL
BUN BLD-MCNC: 11 MG/DL (ref 6–20)
BUN/CREAT SERPL: 14.7 (ref 7–25)
C PNEUM DNA NPH QL NAA+NON-PROBE: NOT DETECTED
CALCIUM SPEC-SCNC: 7.7 MG/DL (ref 8.6–10.5)
CHLORIDE SERPL-SCNC: 111 MMOL/L (ref 98–107)
CO2 BLDA-SCNC: 17.6 MMOL/L (ref 22–29)
CO2 SERPL-SCNC: 18 MMOL/L (ref 22–29)
CREAT BLD-MCNC: 0.75 MG/DL (ref 0.57–1)
DEPRECATED RDW RBC AUTO: 50.8 FL (ref 37–54)
EOSINOPHIL # BLD AUTO: 0 10*3/MM3 (ref 0–0.4)
EOSINOPHIL NFR BLD AUTO: 0 % (ref 0.3–6.2)
ERYTHROCYTE [DISTWIDTH] IN BLOOD BY AUTOMATED COUNT: 15.9 % (ref 12.3–15.4)
FLUAV H1 2009 PAND RNA NPH QL NAA+PROBE: NOT DETECTED
FLUAV H1 HA GENE NPH QL NAA+PROBE: NOT DETECTED
FLUAV H3 RNA NPH QL NAA+PROBE: NOT DETECTED
FLUAV SUBTYP SPEC NAA+PROBE: NOT DETECTED
FLUBV RNA ISLT QL NAA+PROBE: NOT DETECTED
GFR SERPL CREATININE-BSD FRML MDRD: 84 ML/MIN/1.73
GLUCOSE BLD-MCNC: 176 MG/DL (ref 65–99)
HADV DNA SPEC NAA+PROBE: NOT DETECTED
HCO3 BLDA-SCNC: 16.7 MMOL/L (ref 21–28)
HCOV 229E RNA SPEC QL NAA+PROBE: NOT DETECTED
HCOV HKU1 RNA SPEC QL NAA+PROBE: NOT DETECTED
HCOV NL63 RNA SPEC QL NAA+PROBE: NOT DETECTED
HCOV OC43 RNA SPEC QL NAA+PROBE: NOT DETECTED
HCT VFR BLD AUTO: 24.7 % (ref 34–46.6)
HEMODILUTION: NO
HGB BLD-MCNC: 7.8 G/DL (ref 12–15.9)
HMPV RNA NPH QL NAA+NON-PROBE: NOT DETECTED
HOROWITZ INDEX BLD+IHG-RTO: 36 %
HPIV1 RNA SPEC QL NAA+PROBE: NOT DETECTED
HPIV2 RNA SPEC QL NAA+PROBE: NOT DETECTED
HPIV3 RNA NPH QL NAA+PROBE: NOT DETECTED
HPIV4 P GENE NPH QL NAA+PROBE: NOT DETECTED
L PNEUMO1 AG UR QL IA: NEGATIVE
LYMPHOCYTES # BLD AUTO: 0.9 10*3/MM3 (ref 0.7–3.1)
LYMPHOCYTES NFR BLD AUTO: 5 % (ref 19.6–45.3)
M PNEUMO IGG SER IA-ACNC: NOT DETECTED
MCH RBC QN AUTO: 27.9 PG (ref 26.6–33)
MCHC RBC AUTO-ENTMCNC: 31.5 G/DL (ref 31.5–35.7)
MCV RBC AUTO: 88.4 FL (ref 79–97)
MODALITY: ABNORMAL
MONOCYTES # BLD AUTO: 0.5 10*3/MM3 (ref 0.1–0.9)
MONOCYTES NFR BLD AUTO: 2.9 % (ref 5–12)
NEUTROPHILS # BLD AUTO: 15.7 10*3/MM3 (ref 1.7–7)
NEUTROPHILS NFR BLD AUTO: 91.9 % (ref 42.7–76)
NRBC BLD AUTO-RTO: 0 /100 WBC (ref 0–0.2)
PCO2 BLDA: 29 MM HG (ref 35–48)
PH BLDA: 7.37 PH UNITS (ref 7.35–7.45)
PLATELET # BLD AUTO: 318 10*3/MM3 (ref 140–450)
PMV BLD AUTO: 8.1 FL (ref 6–12)
PO2 BLDA: 57.1 MM HG (ref 83–108)
POTASSIUM BLD-SCNC: 4.1 MMOL/L (ref 3.5–5.2)
RBC # BLD AUTO: 2.79 10*6/MM3 (ref 3.77–5.28)
RHINOVIRUS RNA SPEC NAA+PROBE: NOT DETECTED
RSV RNA NPH QL NAA+NON-PROBE: NOT DETECTED
S PNEUM AG SPEC QL LA: NEGATIVE
SAO2 % BLDCOA: 89 % (ref 94–98)
SODIUM BLD-SCNC: 138 MMOL/L (ref 136–145)
WBC NRBC COR # BLD: 17.1 10*3/MM3 (ref 3.4–10.8)

## 2019-10-27 PROCEDURE — 87591 N.GONORRHOEAE DNA AMP PROB: CPT | Performed by: NURSE PRACTITIONER

## 2019-10-27 PROCEDURE — 0099U HC BIOFIRE FILMARRAY RESP PANEL 1: CPT | Performed by: NURSE PRACTITIONER

## 2019-10-27 PROCEDURE — 99233 SBSQ HOSP IP/OBS HIGH 50: CPT | Performed by: INTERNAL MEDICINE

## 2019-10-27 PROCEDURE — 71045 X-RAY EXAM CHEST 1 VIEW: CPT

## 2019-10-27 PROCEDURE — 71275 CT ANGIOGRAPHY CHEST: CPT

## 2019-10-27 PROCEDURE — 94799 UNLISTED PULMONARY SVC/PX: CPT

## 2019-10-27 PROCEDURE — 25010000002 MORPHINE PER 10 MG: Performed by: SURGERY

## 2019-10-27 PROCEDURE — 87899 AGENT NOS ASSAY W/OPTIC: CPT | Performed by: NURSE PRACTITIONER

## 2019-10-27 PROCEDURE — 82803 BLOOD GASES ANY COMBINATION: CPT

## 2019-10-27 PROCEDURE — 94762 N-INVAS EAR/PLS OXIMTRY CONT: CPT

## 2019-10-27 PROCEDURE — 25010000002 PIPERACILLIN SOD-TAZOBACTAM PER 1 G: Performed by: INTERNAL MEDICINE

## 2019-10-27 PROCEDURE — 80048 BASIC METABOLIC PNL TOTAL CA: CPT | Performed by: INTERNAL MEDICINE

## 2019-10-27 PROCEDURE — 25010000002 ENOXAPARIN PER 10 MG: Performed by: SURGERY

## 2019-10-27 PROCEDURE — 36600 WITHDRAWAL OF ARTERIAL BLOOD: CPT

## 2019-10-27 PROCEDURE — 87491 CHLMYD TRACH DNA AMP PROBE: CPT | Performed by: NURSE PRACTITIONER

## 2019-10-27 PROCEDURE — 85025 COMPLETE CBC W/AUTO DIFF WBC: CPT | Performed by: INTERNAL MEDICINE

## 2019-10-27 PROCEDURE — 99024 POSTOP FOLLOW-UP VISIT: CPT | Performed by: SURGERY

## 2019-10-27 PROCEDURE — 0 IOPAMIDOL PER 1 ML: Performed by: INTERNAL MEDICINE

## 2019-10-27 PROCEDURE — 94640 AIRWAY INHALATION TREATMENT: CPT

## 2019-10-27 RX ORDER — NALOXONE HCL 0.4 MG/ML
0.1 VIAL (ML) INJECTION
Status: DISCONTINUED | OUTPATIENT
Start: 2019-10-27 | End: 2019-11-03 | Stop reason: HOSPADM

## 2019-10-27 RX ORDER — HYDROMORPHONE HCL 110MG/55ML
0.5 PATIENT CONTROLLED ANALGESIA SYRINGE INTRAVENOUS
Status: DISCONTINUED | OUTPATIENT
Start: 2019-10-27 | End: 2019-11-03 | Stop reason: HOSPADM

## 2019-10-27 RX ORDER — IPRATROPIUM BROMIDE AND ALBUTEROL SULFATE 2.5; .5 MG/3ML; MG/3ML
3 SOLUTION RESPIRATORY (INHALATION)
Status: DISCONTINUED | OUTPATIENT
Start: 2019-10-27 | End: 2019-10-31

## 2019-10-27 RX ADMIN — PIPERACILLIN AND TAZOBACTAM 3.38 G: 3; .375 INJECTION, POWDER, LYOPHILIZED, FOR SOLUTION INTRAVENOUS at 03:36

## 2019-10-27 RX ADMIN — ENOXAPARIN SODIUM 40 MG: 40 INJECTION SUBCUTANEOUS at 17:19

## 2019-10-27 RX ADMIN — DOXYCYCLINE 100 MG: 100 INJECTION, POWDER, LYOPHILIZED, FOR SOLUTION INTRAVENOUS at 03:19

## 2019-10-27 RX ADMIN — DEXTROSE MONOHYDRATE AND SODIUM CHLORIDE 125 ML/HR: 5; .45 INJECTION, SOLUTION INTRAVENOUS at 14:38

## 2019-10-27 RX ADMIN — MORPHINE SULFATE 4 MG: 4 INJECTION INTRAVENOUS at 07:20

## 2019-10-27 RX ADMIN — IPRATROPIUM BROMIDE AND ALBUTEROL SULFATE 3 ML: .5; 3 SOLUTION RESPIRATORY (INHALATION) at 23:30

## 2019-10-27 RX ADMIN — DOXYCYCLINE 100 MG: 100 INJECTION, POWDER, LYOPHILIZED, FOR SOLUTION INTRAVENOUS at 14:40

## 2019-10-27 RX ADMIN — IPRATROPIUM BROMIDE AND ALBUTEROL SULFATE 3 ML: .5; 3 SOLUTION RESPIRATORY (INHALATION) at 18:57

## 2019-10-27 RX ADMIN — Medication 3 ML: at 08:35

## 2019-10-27 RX ADMIN — IOPAMIDOL 100 ML: 755 INJECTION, SOLUTION INTRAVENOUS at 21:15

## 2019-10-27 RX ADMIN — DEXTROSE MONOHYDRATE AND SODIUM CHLORIDE 125 ML/HR: 5; .45 INJECTION, SOLUTION INTRAVENOUS at 06:44

## 2019-10-27 RX ADMIN — Medication 3 ML: at 21:00

## 2019-10-27 RX ADMIN — Medication 10 ML: at 08:34

## 2019-10-27 RX ADMIN — Medication 10 ML: at 21:00

## 2019-10-27 RX ADMIN — FAMOTIDINE 20 MG: 10 INJECTION, SOLUTION INTRAVENOUS at 21:00

## 2019-10-27 RX ADMIN — FAMOTIDINE 20 MG: 10 INJECTION, SOLUTION INTRAVENOUS at 08:33

## 2019-10-27 RX ADMIN — PIPERACILLIN AND TAZOBACTAM 3.38 G: 3; .375 INJECTION, POWDER, LYOPHILIZED, FOR SOLUTION INTRAVENOUS at 17:19

## 2019-10-27 RX ADMIN — PAROXETINE HYDROCHLORIDE 40 MG: 20 TABLET, FILM COATED ORAL at 08:33

## 2019-10-27 RX ADMIN — PIPERACILLIN AND TAZOBACTAM 3.38 G: 3; .375 INJECTION, POWDER, LYOPHILIZED, FOR SOLUTION INTRAVENOUS at 21:00

## 2019-10-27 RX ADMIN — PIPERACILLIN AND TAZOBACTAM 3.38 G: 3; .375 INJECTION, POWDER, LYOPHILIZED, FOR SOLUTION INTRAVENOUS at 08:33

## 2019-10-27 RX ADMIN — HYDROCODONE BITARTRATE AND ACETAMINOPHEN 1 TABLET: 10; 325 TABLET ORAL at 14:42

## 2019-10-27 NOTE — ANESTHESIA POSTPROCEDURE EVALUATION
Patient: Jolene Burnham    Procedure Summary     Date:  10/26/19 Room / Location:  [facility] OR 08 / [facility] MAIN OR    Anesthesia Start:  1754 Anesthesia Stop:  1902    Procedure:  LAPAROTOMY EXPLORATORY  WITH RIGHT SALPINGECTOMY (N/A Abdomen) Diagnosis:      Surgeon:  Jessica Taylor MD Provider:  David Gipson MD    Anesthesia Type:  general ASA Status:  2 - Emergent          Anesthesia Type: general  Last vitals  BP   109/68 (10/26/19 2000)   Temp   97.6 °F (36.4 °C) (10/26/19 2000)   Pulse   97 (10/26/19 2000)   Resp   20 (10/26/19 2000)     SpO2   98 % (10/26/19 2000)     Post Anesthesia Care and Evaluation    Patient location during evaluation: PACU  Patient participation: complete - patient participated  Level of consciousness: responsive to light touch  Pain score: 0  Pain management: adequate  Airway patency: patent  Anesthetic complications: No anesthetic complications  PONV Status: none  Cardiovascular status: acceptable  Respiratory status: acceptable  Hydration status: acceptable    Comments: Patient seen and examined postoperatively; vital signs stable; SpO2 greater than or equal to 90%; cardiopulmonary status stable; nausea/vomiting adequately controlled; pain adequately controlled; no apparent anesthesia complications; patient discharged from anesthesia care when discharge criteria were met

## 2019-10-28 ENCOUNTER — HOSPITAL ENCOUNTER (INPATIENT)
Dept: CARDIOLOGY | Facility: HOSPITAL | Age: 44
Discharge: HOME | End: 2019-10-28

## 2019-10-28 VITALS
SYSTOLIC BLOOD PRESSURE: 117 MMHG | HEIGHT: 60 IN | BODY MASS INDEX: 27.88 KG/M2 | WEIGHT: 142 LBS | DIASTOLIC BLOOD PRESSURE: 83 MMHG

## 2019-10-28 LAB
ANION GAP SERPL CALCULATED.3IONS-SCNC: 11 MMOL/L (ref 5–15)
BACTERIA FLD CULT: ABNORMAL
BASOPHILS # BLD AUTO: 0 10*3/MM3 (ref 0–0.2)
BASOPHILS NFR BLD AUTO: 0.2 % (ref 0–1.5)
BH CV ECHO MEAS - ACS: 1.9 CM
BH CV ECHO MEAS - AO MAX PG (FULL): 5.5 MMHG
BH CV ECHO MEAS - AO MAX PG: 12.5 MMHG
BH CV ECHO MEAS - AO MEAN PG (FULL): 3.8 MMHG
BH CV ECHO MEAS - AO MEAN PG: 7 MMHG
BH CV ECHO MEAS - AO ROOT AREA (BSA CORRECTED): 1.9
BH CV ECHO MEAS - AO ROOT AREA: 7.2 CM^2
BH CV ECHO MEAS - AO ROOT DIAM: 3 CM
BH CV ECHO MEAS - AO V2 MAX: 176.6 CM/SEC
BH CV ECHO MEAS - AO V2 MEAN: 125.8 CM/SEC
BH CV ECHO MEAS - AO V2 VTI: 36.4 CM
BH CV ECHO MEAS - AORTIC HR: 90.6 BPM
BH CV ECHO MEAS - AORTIC R-R: 0.66 SEC
BH CV ECHO MEAS - AVA(I,A): 1.7 CM^2
BH CV ECHO MEAS - AVA(I,D): 1.7 CM^2
BH CV ECHO MEAS - AVA(V,A): 1.7 CM^2
BH CV ECHO MEAS - AVA(V,D): 1.7 CM^2
BH CV ECHO MEAS - BSA(HAYCOCK): 1.7 M^2
BH CV ECHO MEAS - BSA: 1.6 M^2
BH CV ECHO MEAS - BZI_BMI: 27.7 KILOGRAMS/M^2
BH CV ECHO MEAS - BZI_METRIC_HEIGHT: 152.4 CM
BH CV ECHO MEAS - BZI_METRIC_WEIGHT: 64.4 KG
BH CV ECHO MEAS - CI(AO): 14.7 L/MIN/M^2
BH CV ECHO MEAS - CI(LVOT): 3.6 L/MIN/M^2
BH CV ECHO MEAS - CO(AO): 23.8 L/MIN
BH CV ECHO MEAS - CO(LVOT): 5.7 L/MIN
BH CV ECHO MEAS - EDV(CUBED): 98.7 ML
BH CV ECHO MEAS - EDV(MOD-SP4): 98.8 ML
BH CV ECHO MEAS - EDV(TEICH): 98.4 ML
BH CV ECHO MEAS - EF(CUBED): 63.3 %
BH CV ECHO MEAS - EF(MOD-BP): 64 %
BH CV ECHO MEAS - EF(MOD-SP4): 63.6 %
BH CV ECHO MEAS - EF(TEICH): 54.8 %
BH CV ECHO MEAS - ESV(CUBED): 36.3 ML
BH CV ECHO MEAS - ESV(MOD-SP4): 36 ML
BH CV ECHO MEAS - ESV(TEICH): 44.5 ML
BH CV ECHO MEAS - FS: 28.4 %
BH CV ECHO MEAS - IVS/LVPW: 0.83
BH CV ECHO MEAS - IVSD: 0.71 CM
BH CV ECHO MEAS - LA DIMENSION(2D): 3.2 CM
BH CV ECHO MEAS - LV DIASTOLIC VOL/BSA (35-75): 61.2 ML/M^2
BH CV ECHO MEAS - LV MASS(C)D: 115.7 GRAMS
BH CV ECHO MEAS - LV MASS(C)DI: 71.7 GRAMS/M^2
BH CV ECHO MEAS - LV MAX PG: 7 MMHG
BH CV ECHO MEAS - LV MEAN PG: 3.2 MMHG
BH CV ECHO MEAS - LV SYSTOLIC VOL/BSA (12-30): 22.3 ML/M^2
BH CV ECHO MEAS - LV V1 MAX: 131.8 CM/SEC
BH CV ECHO MEAS - LV V1 MEAN: 81.7 CM/SEC
BH CV ECHO MEAS - LV V1 VTI: 28 CM
BH CV ECHO MEAS - LVIDD: 4.6 CM
BH CV ECHO MEAS - LVIDS: 3.3 CM
BH CV ECHO MEAS - LVOT AREA: 2.3 CM^2
BH CV ECHO MEAS - LVOT DIAM: 1.7 CM
BH CV ECHO MEAS - LVPWD: 0.85 CM
BH CV ECHO MEAS - MV A MAX VEL: 78.5 CM/SEC
BH CV ECHO MEAS - MV DEC SLOPE: 617.6 CM/SEC^2
BH CV ECHO MEAS - MV DEC TIME: 0.15 SEC
BH CV ECHO MEAS - MV E MAX VEL: 95 CM/SEC
BH CV ECHO MEAS - MV E/A: 1.2
BH CV ECHO MEAS - MV MAX PG: 5.6 MMHG
BH CV ECHO MEAS - MV MEAN PG: 3.1 MMHG
BH CV ECHO MEAS - MV V2 MAX: 118.4 CM/SEC
BH CV ECHO MEAS - MV V2 MEAN: 83.7 CM/SEC
BH CV ECHO MEAS - MV V2 VTI: 19.4 CM
BH CV ECHO MEAS - MVA(VTI): 3.3 CM^2
BH CV ECHO MEAS - PA ACC TIME: 0.15 SEC
BH CV ECHO MEAS - PA MAX PG (FULL): 5.2 MMHG
BH CV ECHO MEAS - PA MAX PG: 6.9 MMHG
BH CV ECHO MEAS - PA PR(ACCEL): 12.2 MMHG
BH CV ECHO MEAS - PA V2 MAX: 130.8 CM/SEC
BH CV ECHO MEAS - PI END-D VEL: 170.3 CM/SEC
BH CV ECHO MEAS - PI MAX PG: 24.3 MMHG
BH CV ECHO MEAS - PI MAX VEL: 246.5 CM/SEC
BH CV ECHO MEAS - PULM DIAS VEL: 61.5 CM/SEC
BH CV ECHO MEAS - PULM S/D: 1.2
BH CV ECHO MEAS - PULM SYS VEL: 73.3 CM/SEC
BH CV ECHO MEAS - RAP SYSTOLE: 3 MMHG
BH CV ECHO MEAS - RV MAX PG: 1.7 MMHG
BH CV ECHO MEAS - RV MEAN PG: 1 MMHG
BH CV ECHO MEAS - RV V1 MAX: 65.2 CM/SEC
BH CV ECHO MEAS - RV V1 MEAN: 48.2 CM/SEC
BH CV ECHO MEAS - RV V1 VTI: 13.9 CM
BH CV ECHO MEAS - RVSP: 31.6 MMHG
BH CV ECHO MEAS - SI(AO): 162.4 ML/M^2
BH CV ECHO MEAS - SI(CUBED): 38.7 ML/M^2
BH CV ECHO MEAS - SI(LVOT): 39.3 ML/M^2
BH CV ECHO MEAS - SI(MOD-SP4): 38.9 ML/M^2
BH CV ECHO MEAS - SI(TEICH): 33.4 ML/M^2
BH CV ECHO MEAS - SV(AO): 262.2 ML
BH CV ECHO MEAS - SV(CUBED): 62.4 ML
BH CV ECHO MEAS - SV(LVOT): 63.3 ML
BH CV ECHO MEAS - SV(MOD-SP4): 62.8 ML
BH CV ECHO MEAS - SV(TEICH): 53.9 ML
BH CV ECHO MEAS - TR MAX VEL: 267.3 CM/SEC
BH CV LOWER VASCULAR LEFT COMMON FEMORAL AUGMENT: NORMAL
BH CV LOWER VASCULAR LEFT COMMON FEMORAL COMPETENT: NORMAL
BH CV LOWER VASCULAR LEFT COMMON FEMORAL COMPRESS: NORMAL
BH CV LOWER VASCULAR LEFT COMMON FEMORAL PHASIC: NORMAL
BH CV LOWER VASCULAR LEFT COMMON FEMORAL SPONT: NORMAL
BH CV LOWER VASCULAR LEFT DISTAL FEMORAL COMPRESS: NORMAL
BH CV LOWER VASCULAR LEFT GASTRONEMIUS COMPRESS: NORMAL
BH CV LOWER VASCULAR LEFT GREATER SAPH AK COMPRESS: NORMAL
BH CV LOWER VASCULAR LEFT GREATER SAPH BK COMPRESS: NORMAL
BH CV LOWER VASCULAR LEFT LESSER SAPH COMPRESS: NORMAL
BH CV LOWER VASCULAR LEFT MID FEMORAL AUGMENT: NORMAL
BH CV LOWER VASCULAR LEFT MID FEMORAL COMPETENT: NORMAL
BH CV LOWER VASCULAR LEFT MID FEMORAL COMPRESS: NORMAL
BH CV LOWER VASCULAR LEFT MID FEMORAL PHASIC: NORMAL
BH CV LOWER VASCULAR LEFT MID FEMORAL SPONT: NORMAL
BH CV LOWER VASCULAR LEFT PERONEAL COMPRESS: NORMAL
BH CV LOWER VASCULAR LEFT POPLITEAL AUGMENT: NORMAL
BH CV LOWER VASCULAR LEFT POPLITEAL COMPETENT: NORMAL
BH CV LOWER VASCULAR LEFT POPLITEAL COMPRESS: NORMAL
BH CV LOWER VASCULAR LEFT POPLITEAL PHASIC: NORMAL
BH CV LOWER VASCULAR LEFT POPLITEAL SPONT: NORMAL
BH CV LOWER VASCULAR LEFT POSTERIOR TIBIAL COMPRESS: NORMAL
BH CV LOWER VASCULAR LEFT PROXIMAL FEMORAL COMPRESS: NORMAL
BH CV LOWER VASCULAR LEFT SAPHENOFEMORAL JUNCTION AUGMENT: NORMAL
BH CV LOWER VASCULAR LEFT SAPHENOFEMORAL JUNCTION COMPETENT: NORMAL
BH CV LOWER VASCULAR LEFT SAPHENOFEMORAL JUNCTION COMPRESS: NORMAL
BH CV LOWER VASCULAR LEFT SAPHENOFEMORAL JUNCTION PHASIC: NORMAL
BH CV LOWER VASCULAR LEFT SAPHENOFEMORAL JUNCTION SPONT: NORMAL
BH CV LOWER VASCULAR RIGHT COMMON FEMORAL AUGMENT: NORMAL
BH CV LOWER VASCULAR RIGHT COMMON FEMORAL COMPETENT: NORMAL
BH CV LOWER VASCULAR RIGHT COMMON FEMORAL COMPRESS: NORMAL
BH CV LOWER VASCULAR RIGHT COMMON FEMORAL PHASIC: NORMAL
BH CV LOWER VASCULAR RIGHT COMMON FEMORAL SPONT: NORMAL
BH CV LOWER VASCULAR RIGHT DISTAL FEMORAL COMPRESS: NORMAL
BH CV LOWER VASCULAR RIGHT GASTRONEMIUS COMPRESS: NORMAL
BH CV LOWER VASCULAR RIGHT GREATER SAPH AK COMPRESS: NORMAL
BH CV LOWER VASCULAR RIGHT GREATER SAPH BK COMPRESS: NORMAL
BH CV LOWER VASCULAR RIGHT LESSER SAPH COMPRESS: NORMAL
BH CV LOWER VASCULAR RIGHT MID FEMORAL AUGMENT: NORMAL
BH CV LOWER VASCULAR RIGHT MID FEMORAL COMPETENT: NORMAL
BH CV LOWER VASCULAR RIGHT MID FEMORAL COMPRESS: NORMAL
BH CV LOWER VASCULAR RIGHT MID FEMORAL PHASIC: NORMAL
BH CV LOWER VASCULAR RIGHT MID FEMORAL SPONT: NORMAL
BH CV LOWER VASCULAR RIGHT PERONEAL COMPRESS: NORMAL
BH CV LOWER VASCULAR RIGHT POPLITEAL AUGMENT: NORMAL
BH CV LOWER VASCULAR RIGHT POPLITEAL COMPETENT: NORMAL
BH CV LOWER VASCULAR RIGHT POPLITEAL COMPRESS: NORMAL
BH CV LOWER VASCULAR RIGHT POPLITEAL PHASIC: NORMAL
BH CV LOWER VASCULAR RIGHT POPLITEAL SPONT: NORMAL
BH CV LOWER VASCULAR RIGHT POSTERIOR TIBIAL COMPRESS: NORMAL
BH CV LOWER VASCULAR RIGHT PROXIMAL FEMORAL COMPRESS: NORMAL
BH CV LOWER VASCULAR RIGHT SAPHENOFEMORAL JUNCTION AUGMENT: NORMAL
BH CV LOWER VASCULAR RIGHT SAPHENOFEMORAL JUNCTION COMPETENT: NORMAL
BH CV LOWER VASCULAR RIGHT SAPHENOFEMORAL JUNCTION COMPRESS: NORMAL
BH CV LOWER VASCULAR RIGHT SAPHENOFEMORAL JUNCTION PHASIC: NORMAL
BH CV LOWER VASCULAR RIGHT SAPHENOFEMORAL JUNCTION SPONT: NORMAL
BUN BLD-MCNC: 7 MG/DL (ref 6–20)
BUN/CREAT SERPL: 9.6 (ref 7–25)
C TRACH RRNA CVX QL NAA+PROBE: NOT DETECTED
CALCIUM SPEC-SCNC: 8.4 MG/DL (ref 8.6–10.5)
CHLORIDE SERPL-SCNC: 112 MMOL/L (ref 98–107)
CO2 SERPL-SCNC: 18 MMOL/L (ref 22–29)
CREAT BLD-MCNC: 0.73 MG/DL (ref 0.57–1)
DEPRECATED RDW RBC AUTO: 49.4 FL (ref 37–54)
EOSINOPHIL # BLD AUTO: 0 10*3/MM3 (ref 0–0.4)
EOSINOPHIL NFR BLD AUTO: 0 % (ref 0.3–6.2)
ERYTHROCYTE [DISTWIDTH] IN BLOOD BY AUTOMATED COUNT: 16 % (ref 12.3–15.4)
GFR SERPL CREATININE-BSD FRML MDRD: 87 ML/MIN/1.73
GLUCOSE BLD-MCNC: 159 MG/DL (ref 65–99)
GRAM STN SPEC: ABNORMAL
GRAM STN SPEC: ABNORMAL
HCT VFR BLD AUTO: 22.7 % (ref 34–46.6)
HGB BLD-MCNC: 7.5 G/DL (ref 12–15.9)
LV EF 2D ECHO EST: 60 %
LYMPHOCYTES # BLD AUTO: 1.2 10*3/MM3 (ref 0.7–3.1)
LYMPHOCYTES NFR BLD AUTO: 6.5 % (ref 19.6–45.3)
MCH RBC QN AUTO: 28.4 PG (ref 26.6–33)
MCHC RBC AUTO-ENTMCNC: 33.1 G/DL (ref 31.5–35.7)
MCV RBC AUTO: 85.9 FL (ref 79–97)
MONOCYTES # BLD AUTO: 1 10*3/MM3 (ref 0.1–0.9)
MONOCYTES NFR BLD AUTO: 5.4 % (ref 5–12)
N GONORRHOEA RRNA SPEC QL NAA+PROBE: NOT DETECTED
NEUTROPHILS # BLD AUTO: 15.8 10*3/MM3 (ref 1.7–7)
NEUTROPHILS NFR BLD AUTO: 87.9 % (ref 42.7–76)
NRBC BLD AUTO-RTO: 0 /100 WBC (ref 0–0.2)
PLATELET # BLD AUTO: 383 10*3/MM3 (ref 140–450)
PMV BLD AUTO: 8.1 FL (ref 6–12)
POTASSIUM BLD-SCNC: 3.3 MMOL/L (ref 3.5–5.2)
RBC # BLD AUTO: 2.64 10*6/MM3 (ref 3.77–5.28)
SODIUM BLD-SCNC: 141 MMOL/L (ref 136–145)
WBC NRBC COR # BLD: 17.9 10*3/MM3 (ref 3.4–10.8)

## 2019-10-28 PROCEDURE — 99232 SBSQ HOSP IP/OBS MODERATE 35: CPT | Performed by: INTERNAL MEDICINE

## 2019-10-28 PROCEDURE — 94799 UNLISTED PULMONARY SVC/PX: CPT

## 2019-10-28 PROCEDURE — 93306 TTE W/DOPPLER COMPLETE: CPT

## 2019-10-28 PROCEDURE — 25010000002 ENOXAPARIN PER 10 MG: Performed by: SURGERY

## 2019-10-28 PROCEDURE — 85025 COMPLETE CBC W/AUTO DIFF WBC: CPT | Performed by: INTERNAL MEDICINE

## 2019-10-28 PROCEDURE — 25010000002 FUROSEMIDE PER 20 MG: Performed by: INTERNAL MEDICINE

## 2019-10-28 PROCEDURE — 94762 N-INVAS EAR/PLS OXIMTRY CONT: CPT

## 2019-10-28 PROCEDURE — 80048 BASIC METABOLIC PNL TOTAL CA: CPT | Performed by: INTERNAL MEDICINE

## 2019-10-28 PROCEDURE — 25010000002 VANCOMYCIN 10 G RECONSTITUTED SOLUTION: Performed by: INTERNAL MEDICINE

## 2019-10-28 PROCEDURE — 25010000002 PIPERACILLIN SOD-TAZOBACTAM PER 1 G: Performed by: INTERNAL MEDICINE

## 2019-10-28 PROCEDURE — 93306 TTE W/DOPPLER COMPLETE: CPT | Performed by: INTERNAL MEDICINE

## 2019-10-28 PROCEDURE — 25010000002 MEROPENEM PER 100 MG: Performed by: NURSE PRACTITIONER

## 2019-10-28 PROCEDURE — 93970 EXTREMITY STUDY: CPT

## 2019-10-28 RX ORDER — IBUPROFEN 200 MG
200 TABLET ORAL EVERY 6 HOURS PRN
Status: DISCONTINUED | OUTPATIENT
Start: 2019-10-28 | End: 2019-11-03 | Stop reason: HOSPADM

## 2019-10-28 RX ORDER — FUROSEMIDE 10 MG/ML
40 INJECTION INTRAMUSCULAR; INTRAVENOUS
Status: DISCONTINUED | OUTPATIENT
Start: 2019-10-28 | End: 2019-10-30

## 2019-10-28 RX ADMIN — PIPERACILLIN AND TAZOBACTAM 3.38 G: 3; .375 INJECTION, POWDER, LYOPHILIZED, FOR SOLUTION INTRAVENOUS at 03:25

## 2019-10-28 RX ADMIN — IPRATROPIUM BROMIDE AND ALBUTEROL SULFATE 3 ML: .5; 3 SOLUTION RESPIRATORY (INHALATION) at 15:17

## 2019-10-28 RX ADMIN — FAMOTIDINE 20 MG: 10 INJECTION, SOLUTION INTRAVENOUS at 08:09

## 2019-10-28 RX ADMIN — ACETAMINOPHEN 650 MG: 325 TABLET ORAL at 18:31

## 2019-10-28 RX ADMIN — HYDROCODONE BITARTRATE AND ACETAMINOPHEN 1 TABLET: 10; 325 TABLET ORAL at 14:04

## 2019-10-28 RX ADMIN — VANCOMYCIN HYDROCHLORIDE 1000 MG: 10 INJECTION, POWDER, LYOPHILIZED, FOR SOLUTION INTRAVENOUS at 15:49

## 2019-10-28 RX ADMIN — Medication 10 ML: at 08:10

## 2019-10-28 RX ADMIN — HYDROCODONE BITARTRATE AND ACETAMINOPHEN 1 TABLET: 10; 325 TABLET ORAL at 08:13

## 2019-10-28 RX ADMIN — PAROXETINE HYDROCHLORIDE 40 MG: 20 TABLET, FILM COATED ORAL at 08:09

## 2019-10-28 RX ADMIN — IPRATROPIUM BROMIDE AND ALBUTEROL SULFATE 3 ML: .5; 3 SOLUTION RESPIRATORY (INHALATION) at 03:42

## 2019-10-28 RX ADMIN — MEROPENEM 500 MG: 500 INJECTION, POWDER, FOR SOLUTION INTRAVENOUS at 21:00

## 2019-10-28 RX ADMIN — FUROSEMIDE 40 MG: 10 INJECTION, SOLUTION INTRAMUSCULAR; INTRAVENOUS at 17:35

## 2019-10-28 RX ADMIN — IPRATROPIUM BROMIDE AND ALBUTEROL SULFATE 3 ML: .5; 3 SOLUTION RESPIRATORY (INHALATION) at 07:55

## 2019-10-28 RX ADMIN — DOXYCYCLINE 100 MG: 100 INJECTION, POWDER, LYOPHILIZED, FOR SOLUTION INTRAVENOUS at 12:18

## 2019-10-28 RX ADMIN — MEROPENEM 500 MG: 500 INJECTION, POWDER, FOR SOLUTION INTRAVENOUS at 15:49

## 2019-10-28 RX ADMIN — IPRATROPIUM BROMIDE AND ALBUTEROL SULFATE 3 ML: .5; 3 SOLUTION RESPIRATORY (INHALATION) at 23:50

## 2019-10-28 RX ADMIN — POTASSIUM CHLORIDE 40 MEQ: 1500 TABLET, EXTENDED RELEASE ORAL at 08:09

## 2019-10-28 RX ADMIN — ENOXAPARIN SODIUM 40 MG: 40 INJECTION SUBCUTANEOUS at 15:50

## 2019-10-28 RX ADMIN — FAMOTIDINE 20 MG: 10 INJECTION, SOLUTION INTRAVENOUS at 21:00

## 2019-10-28 RX ADMIN — Medication 3 ML: at 21:00

## 2019-10-28 RX ADMIN — Medication 3 ML: at 08:47

## 2019-10-28 RX ADMIN — PIPERACILLIN AND TAZOBACTAM 3.38 G: 3; .375 INJECTION, POWDER, LYOPHILIZED, FOR SOLUTION INTRAVENOUS at 08:09

## 2019-10-28 RX ADMIN — Medication 10 ML: at 21:00

## 2019-10-28 RX ADMIN — IPRATROPIUM BROMIDE AND ALBUTEROL SULFATE 3 ML: .5; 3 SOLUTION RESPIRATORY (INHALATION) at 11:43

## 2019-10-28 RX ADMIN — IBUPROFEN 200 MG: 200 TABLET, FILM COATED ORAL at 18:29

## 2019-10-28 RX ADMIN — IPRATROPIUM BROMIDE AND ALBUTEROL SULFATE 3 ML: .5; 3 SOLUTION RESPIRATORY (INHALATION) at 18:58

## 2019-10-29 LAB
ALBUMIN SERPL-MCNC: 3 G/DL (ref 3.5–5.2)
ALBUMIN/GLOB SERPL: 0.8 G/DL
ALP SERPL-CCNC: 110 U/L (ref 39–117)
ALT SERPL W P-5'-P-CCNC: 17 U/L (ref 1–33)
ANION GAP SERPL CALCULATED.3IONS-SCNC: 12 MMOL/L (ref 5–15)
AST SERPL-CCNC: 13 U/L (ref 1–32)
BASOPHILS # BLD AUTO: 0.1 10*3/MM3 (ref 0–0.2)
BASOPHILS NFR BLD AUTO: 0.5 % (ref 0–1.5)
BILIRUB SERPL-MCNC: 0.2 MG/DL (ref 0.2–1.2)
BUN BLD-MCNC: 7 MG/DL (ref 6–20)
BUN/CREAT SERPL: 11.5 (ref 7–25)
CALCIUM SPEC-SCNC: 8.8 MG/DL (ref 8.6–10.5)
CHLORIDE SERPL-SCNC: 107 MMOL/L (ref 98–107)
CO2 SERPL-SCNC: 22 MMOL/L (ref 22–29)
CREAT BLD-MCNC: 0.61 MG/DL (ref 0.57–1)
DEPRECATED RDW RBC AUTO: 46.8 FL (ref 37–54)
EOSINOPHIL # BLD AUTO: 0.1 10*3/MM3 (ref 0–0.4)
EOSINOPHIL NFR BLD AUTO: 0.8 % (ref 0.3–6.2)
ERYTHROCYTE [DISTWIDTH] IN BLOOD BY AUTOMATED COUNT: 15.4 % (ref 12.3–15.4)
GFR SERPL CREATININE-BSD FRML MDRD: 107 ML/MIN/1.73
GLOBULIN UR ELPH-MCNC: 3.9 GM/DL
GLUCOSE BLD-MCNC: 92 MG/DL (ref 65–99)
HCT VFR BLD AUTO: 22.9 % (ref 34–46.6)
HGB BLD-MCNC: 7.6 G/DL (ref 12–15.9)
LAB AP CASE REPORT: NORMAL
LAB AP DIAGNOSIS COMMENT: NORMAL
LYMPHOCYTES # BLD AUTO: 1.7 10*3/MM3 (ref 0.7–3.1)
LYMPHOCYTES NFR BLD AUTO: 12.9 % (ref 19.6–45.3)
MCH RBC QN AUTO: 28.4 PG (ref 26.6–33)
MCHC RBC AUTO-ENTMCNC: 33 G/DL (ref 31.5–35.7)
MCV RBC AUTO: 86 FL (ref 79–97)
MONOCYTES # BLD AUTO: 0.8 10*3/MM3 (ref 0.1–0.9)
MONOCYTES NFR BLD AUTO: 6 % (ref 5–12)
NEUTROPHILS # BLD AUTO: 10.3 10*3/MM3 (ref 1.7–7)
NEUTROPHILS NFR BLD AUTO: 79.8 % (ref 42.7–76)
NRBC BLD AUTO-RTO: 0.1 /100 WBC (ref 0–0.2)
PATH REPORT.FINAL DX SPEC: NORMAL
PATH REPORT.GROSS SPEC: NORMAL
PLATELET # BLD AUTO: 448 10*3/MM3 (ref 140–450)
PMV BLD AUTO: 8 FL (ref 6–12)
POTASSIUM BLD-SCNC: 2.7 MMOL/L (ref 3.5–5.2)
POTASSIUM BLD-SCNC: 3.4 MMOL/L (ref 3.5–5.2)
PROT SERPL-MCNC: 6.9 G/DL (ref 6–8.5)
RBC # BLD AUTO: 2.67 10*6/MM3 (ref 3.77–5.28)
SODIUM BLD-SCNC: 141 MMOL/L (ref 136–145)
WBC NRBC COR # BLD: 12.9 10*3/MM3 (ref 3.4–10.8)

## 2019-10-29 PROCEDURE — 25010000002 FUROSEMIDE PER 20 MG: Performed by: INTERNAL MEDICINE

## 2019-10-29 PROCEDURE — 84132 ASSAY OF SERUM POTASSIUM: CPT | Performed by: SURGERY

## 2019-10-29 PROCEDURE — 25010000002 CEFTRIAXONE PER 250 MG: Performed by: NURSE PRACTITIONER

## 2019-10-29 PROCEDURE — 25010000002 ONDANSETRON PER 1 MG: Performed by: SURGERY

## 2019-10-29 PROCEDURE — 99232 SBSQ HOSP IP/OBS MODERATE 35: CPT | Performed by: INTERNAL MEDICINE

## 2019-10-29 PROCEDURE — 85025 COMPLETE CBC W/AUTO DIFF WBC: CPT | Performed by: INTERNAL MEDICINE

## 2019-10-29 PROCEDURE — 94799 UNLISTED PULMONARY SVC/PX: CPT

## 2019-10-29 PROCEDURE — 94762 N-INVAS EAR/PLS OXIMTRY CONT: CPT

## 2019-10-29 PROCEDURE — 25010000002 VANCOMYCIN 750 MG RECONSTITUTED SOLUTION 1 EACH VIAL: Performed by: INTERNAL MEDICINE

## 2019-10-29 PROCEDURE — 80053 COMPREHEN METABOLIC PANEL: CPT | Performed by: INTERNAL MEDICINE

## 2019-10-29 PROCEDURE — 25010000002 MEROPENEM PER 100 MG: Performed by: NURSE PRACTITIONER

## 2019-10-29 PROCEDURE — 99024 POSTOP FOLLOW-UP VISIT: CPT | Performed by: SURGERY

## 2019-10-29 PROCEDURE — 25010000002 ENOXAPARIN PER 10 MG: Performed by: SURGERY

## 2019-10-29 RX ORDER — BUTALBITAL, ACETAMINOPHEN AND CAFFEINE 50; 325; 40 MG/1; MG/1; MG/1
2 TABLET ORAL ONCE
Status: COMPLETED | OUTPATIENT
Start: 2019-10-29 | End: 2019-10-29

## 2019-10-29 RX ADMIN — IPRATROPIUM BROMIDE AND ALBUTEROL SULFATE 3 ML: .5; 3 SOLUTION RESPIRATORY (INHALATION) at 11:10

## 2019-10-29 RX ADMIN — ACETAMINOPHEN 650 MG: 325 TABLET ORAL at 09:25

## 2019-10-29 RX ADMIN — MEROPENEM 500 MG: 500 INJECTION, POWDER, FOR SOLUTION INTRAVENOUS at 03:00

## 2019-10-29 RX ADMIN — PAROXETINE HYDROCHLORIDE 40 MG: 20 TABLET, FILM COATED ORAL at 09:25

## 2019-10-29 RX ADMIN — ACETAMINOPHEN 650 MG: 325 TABLET ORAL at 14:52

## 2019-10-29 RX ADMIN — IPRATROPIUM BROMIDE AND ALBUTEROL SULFATE 3 ML: .5; 3 SOLUTION RESPIRATORY (INHALATION) at 15:07

## 2019-10-29 RX ADMIN — FAMOTIDINE 20 MG: 10 INJECTION, SOLUTION INTRAVENOUS at 23:07

## 2019-10-29 RX ADMIN — CEFTRIAXONE SODIUM 2 G: 2 INJECTION, POWDER, FOR SOLUTION INTRAMUSCULAR; INTRAVENOUS at 23:20

## 2019-10-29 RX ADMIN — Medication 3 ML: at 09:33

## 2019-10-29 RX ADMIN — Medication 10 ML: at 23:25

## 2019-10-29 RX ADMIN — VANCOMYCIN HYDROCHLORIDE 750 MG: 750 INJECTION, POWDER, LYOPHILIZED, FOR SOLUTION INTRAVENOUS at 16:37

## 2019-10-29 RX ADMIN — POTASSIUM CHLORIDE 40 MEQ: 1500 TABLET, EXTENDED RELEASE ORAL at 09:25

## 2019-10-29 RX ADMIN — IPRATROPIUM BROMIDE AND ALBUTEROL SULFATE 3 ML: .5; 3 SOLUTION RESPIRATORY (INHALATION) at 07:27

## 2019-10-29 RX ADMIN — ENOXAPARIN SODIUM 40 MG: 40 INJECTION SUBCUTANEOUS at 16:36

## 2019-10-29 RX ADMIN — VANCOMYCIN HYDROCHLORIDE 750 MG: 750 INJECTION, POWDER, LYOPHILIZED, FOR SOLUTION INTRAVENOUS at 04:17

## 2019-10-29 RX ADMIN — MEROPENEM 500 MG: 500 INJECTION, POWDER, FOR SOLUTION INTRAVENOUS at 14:52

## 2019-10-29 RX ADMIN — ACETAMINOPHEN 650 MG: 325 TABLET ORAL at 04:16

## 2019-10-29 RX ADMIN — FAMOTIDINE 20 MG: 10 INJECTION, SOLUTION INTRAVENOUS at 09:25

## 2019-10-29 RX ADMIN — FUROSEMIDE 40 MG: 10 INJECTION, SOLUTION INTRAMUSCULAR; INTRAVENOUS at 09:25

## 2019-10-29 RX ADMIN — FUROSEMIDE 40 MG: 10 INJECTION, SOLUTION INTRAMUSCULAR; INTRAVENOUS at 17:55

## 2019-10-29 RX ADMIN — ONDANSETRON 4 MG: 2 INJECTION INTRAMUSCULAR; INTRAVENOUS at 05:03

## 2019-10-29 RX ADMIN — Medication 10 ML: at 09:26

## 2019-10-29 RX ADMIN — MEROPENEM 500 MG: 500 INJECTION, POWDER, FOR SOLUTION INTRAVENOUS at 09:32

## 2019-10-29 RX ADMIN — ONDANSETRON 4 MG: 2 INJECTION INTRAMUSCULAR; INTRAVENOUS at 21:00

## 2019-10-29 RX ADMIN — IPRATROPIUM BROMIDE AND ALBUTEROL SULFATE 3 ML: .5; 3 SOLUTION RESPIRATORY (INHALATION) at 02:57

## 2019-10-29 RX ADMIN — BUTALBITAL, ACETAMINOPHEN AND CAFFEINE 2 TABLET: 50; 325; 40 TABLET ORAL at 05:16

## 2019-10-29 RX ADMIN — METRONIDAZOLE 500 MG: 500 INJECTION, SOLUTION INTRAVENOUS at 17:55

## 2019-10-29 RX ADMIN — IBUPROFEN 200 MG: 200 TABLET, FILM COATED ORAL at 13:45

## 2019-10-30 LAB
ALBUMIN SERPL-MCNC: 3.2 G/DL (ref 3.5–5.2)
ALBUMIN/GLOB SERPL: 0.7 G/DL
ALP SERPL-CCNC: 132 U/L (ref 39–117)
ALT SERPL W P-5'-P-CCNC: 19 U/L (ref 1–33)
ANION GAP SERPL CALCULATED.3IONS-SCNC: 16 MMOL/L (ref 5–15)
AST SERPL-CCNC: 20 U/L (ref 1–32)
BASOPHILS # BLD AUTO: 0.1 10*3/MM3 (ref 0–0.2)
BASOPHILS NFR BLD AUTO: 0.9 % (ref 0–1.5)
BILIRUB SERPL-MCNC: 0.2 MG/DL (ref 0.2–1.2)
BUN BLD-MCNC: 9 MG/DL (ref 6–20)
BUN/CREAT SERPL: 13.4 (ref 7–25)
CALCIUM SPEC-SCNC: 9.2 MG/DL (ref 8.6–10.5)
CHLORIDE SERPL-SCNC: 98 MMOL/L (ref 98–107)
CO2 SERPL-SCNC: 25 MMOL/L (ref 22–29)
CREAT BLD-MCNC: 0.67 MG/DL (ref 0.57–1)
DEPRECATED RDW RBC AUTO: 48.1 FL (ref 37–54)
EOSINOPHIL # BLD AUTO: 0.2 10*3/MM3 (ref 0–0.4)
EOSINOPHIL NFR BLD AUTO: 1.1 % (ref 0.3–6.2)
ERYTHROCYTE [DISTWIDTH] IN BLOOD BY AUTOMATED COUNT: 15.9 % (ref 12.3–15.4)
GFR SERPL CREATININE-BSD FRML MDRD: 96 ML/MIN/1.73
GLOBULIN UR ELPH-MCNC: 4.3 GM/DL
GLUCOSE BLD-MCNC: 96 MG/DL (ref 65–99)
HCT VFR BLD AUTO: 27.9 % (ref 34–46.6)
HGB BLD-MCNC: 9.2 G/DL (ref 12–15.9)
LYMPHOCYTES # BLD AUTO: 0.9 10*3/MM3 (ref 0.7–3.1)
LYMPHOCYTES NFR BLD AUTO: 6.9 % (ref 19.6–45.3)
MCH RBC QN AUTO: 27.9 PG (ref 26.6–33)
MCHC RBC AUTO-ENTMCNC: 32.9 G/DL (ref 31.5–35.7)
MCV RBC AUTO: 84.6 FL (ref 79–97)
MONOCYTES # BLD AUTO: 0.6 10*3/MM3 (ref 0.1–0.9)
MONOCYTES NFR BLD AUTO: 4.5 % (ref 5–12)
NEUTROPHILS # BLD AUTO: 11.8 10*3/MM3 (ref 1.7–7)
NEUTROPHILS NFR BLD AUTO: 86.6 % (ref 42.7–76)
NRBC BLD AUTO-RTO: 0.1 /100 WBC (ref 0–0.2)
PLATELET # BLD AUTO: 556 10*3/MM3 (ref 140–450)
PMV BLD AUTO: 7.9 FL (ref 6–12)
POTASSIUM BLD-SCNC: 3.2 MMOL/L (ref 3.5–5.2)
PROT SERPL-MCNC: 7.5 G/DL (ref 6–8.5)
RBC # BLD AUTO: 3.3 10*6/MM3 (ref 3.77–5.28)
SODIUM BLD-SCNC: 139 MMOL/L (ref 136–145)
WBC NRBC COR # BLD: 13.6 10*3/MM3 (ref 3.4–10.8)

## 2019-10-30 PROCEDURE — 25010000002 ENOXAPARIN PER 10 MG: Performed by: SURGERY

## 2019-10-30 PROCEDURE — 94799 UNLISTED PULMONARY SVC/PX: CPT

## 2019-10-30 PROCEDURE — 25010000002 FUROSEMIDE PER 20 MG: Performed by: INTERNAL MEDICINE

## 2019-10-30 PROCEDURE — 85025 COMPLETE CBC W/AUTO DIFF WBC: CPT | Performed by: INTERNAL MEDICINE

## 2019-10-30 PROCEDURE — 25010000002 CEFTRIAXONE PER 250 MG: Performed by: NURSE PRACTITIONER

## 2019-10-30 PROCEDURE — 80053 COMPREHEN METABOLIC PANEL: CPT | Performed by: INTERNAL MEDICINE

## 2019-10-30 PROCEDURE — 99232 SBSQ HOSP IP/OBS MODERATE 35: CPT | Performed by: INTERNAL MEDICINE

## 2019-10-30 PROCEDURE — 25010000002 ONDANSETRON PER 1 MG: Performed by: SURGERY

## 2019-10-30 RX ADMIN — ACETAMINOPHEN 650 MG: 325 TABLET ORAL at 15:21

## 2019-10-30 RX ADMIN — ONDANSETRON 4 MG: 2 INJECTION INTRAMUSCULAR; INTRAVENOUS at 07:46

## 2019-10-30 RX ADMIN — IPRATROPIUM BROMIDE AND ALBUTEROL SULFATE 3 ML: .5; 3 SOLUTION RESPIRATORY (INHALATION) at 14:47

## 2019-10-30 RX ADMIN — ENOXAPARIN SODIUM 40 MG: 40 INJECTION SUBCUTANEOUS at 15:21

## 2019-10-30 RX ADMIN — POTASSIUM CHLORIDE 40 MEQ: 1500 TABLET, EXTENDED RELEASE ORAL at 07:46

## 2019-10-30 RX ADMIN — Medication 3 ML: at 08:01

## 2019-10-30 RX ADMIN — METRONIDAZOLE 500 MG: 500 INJECTION, SOLUTION INTRAVENOUS at 01:47

## 2019-10-30 RX ADMIN — Medication 10 ML: at 23:35

## 2019-10-30 RX ADMIN — Medication 10 ML: at 08:01

## 2019-10-30 RX ADMIN — FAMOTIDINE 20 MG: 10 INJECTION, SOLUTION INTRAVENOUS at 23:21

## 2019-10-30 RX ADMIN — HYDROCODONE BITARTRATE AND ACETAMINOPHEN 1 TABLET: 10; 325 TABLET ORAL at 22:52

## 2019-10-30 RX ADMIN — FUROSEMIDE 40 MG: 10 INJECTION, SOLUTION INTRAMUSCULAR; INTRAVENOUS at 08:00

## 2019-10-30 RX ADMIN — FAMOTIDINE 20 MG: 10 INJECTION, SOLUTION INTRAVENOUS at 08:00

## 2019-10-30 RX ADMIN — CEFTRIAXONE SODIUM 2 G: 2 INJECTION, POWDER, FOR SOLUTION INTRAMUSCULAR; INTRAVENOUS at 23:21

## 2019-10-30 RX ADMIN — PAROXETINE HYDROCHLORIDE 40 MG: 20 TABLET, FILM COATED ORAL at 08:01

## 2019-10-30 RX ADMIN — IPRATROPIUM BROMIDE AND ALBUTEROL SULFATE 3 ML: .5; 3 SOLUTION RESPIRATORY (INHALATION) at 06:57

## 2019-10-30 RX ADMIN — IPRATROPIUM BROMIDE AND ALBUTEROL SULFATE 3 ML: .5; 3 SOLUTION RESPIRATORY (INHALATION) at 20:02

## 2019-10-31 LAB
ALBUMIN SERPL-MCNC: 3.1 G/DL (ref 3.5–5.2)
ALBUMIN/GLOB SERPL: 0.8 G/DL
ALP SERPL-CCNC: 119 U/L (ref 39–117)
ALT SERPL W P-5'-P-CCNC: 15 U/L (ref 1–33)
ANION GAP SERPL CALCULATED.3IONS-SCNC: 12 MMOL/L (ref 5–15)
AST SERPL-CCNC: 20 U/L (ref 1–32)
BACTERIA SPEC AEROBE CULT: NORMAL
BACTERIA SPEC AEROBE CULT: NORMAL
BACTERIA SPEC ANAEROBE CULT: NORMAL
BASOPHILS # BLD AUTO: 0.1 10*3/MM3 (ref 0–0.2)
BASOPHILS NFR BLD AUTO: 1.1 % (ref 0–1.5)
BILIRUB SERPL-MCNC: 0.2 MG/DL (ref 0.2–1.2)
BUN BLD-MCNC: 15 MG/DL (ref 6–20)
BUN/CREAT SERPL: 21.1 (ref 7–25)
CALCIUM SPEC-SCNC: 9 MG/DL (ref 8.6–10.5)
CHLORIDE SERPL-SCNC: 99 MMOL/L (ref 98–107)
CO2 SERPL-SCNC: 27 MMOL/L (ref 22–29)
CREAT BLD-MCNC: 0.71 MG/DL (ref 0.57–1)
DEPRECATED RDW RBC AUTO: 47.7 FL (ref 37–54)
EOSINOPHIL # BLD AUTO: 0.4 10*3/MM3 (ref 0–0.4)
EOSINOPHIL NFR BLD AUTO: 3.4 % (ref 0.3–6.2)
ERYTHROCYTE [DISTWIDTH] IN BLOOD BY AUTOMATED COUNT: 16.1 % (ref 12.3–15.4)
GFR SERPL CREATININE-BSD FRML MDRD: 89 ML/MIN/1.73
GLOBULIN UR ELPH-MCNC: 4.1 GM/DL
GLUCOSE BLD-MCNC: 126 MG/DL (ref 65–99)
HCT VFR BLD AUTO: 26.9 % (ref 34–46.6)
HGB BLD-MCNC: 9.3 G/DL (ref 12–15.9)
LYMPHOCYTES # BLD AUTO: 2.3 10*3/MM3 (ref 0.7–3.1)
LYMPHOCYTES NFR BLD AUTO: 18.9 % (ref 19.6–45.3)
MCH RBC QN AUTO: 29.1 PG (ref 26.6–33)
MCHC RBC AUTO-ENTMCNC: 34.6 G/DL (ref 31.5–35.7)
MCV RBC AUTO: 84.3 FL (ref 79–97)
MONOCYTES # BLD AUTO: 1.1 10*3/MM3 (ref 0.1–0.9)
MONOCYTES NFR BLD AUTO: 9 % (ref 5–12)
NEUTROPHILS # BLD AUTO: 8.2 10*3/MM3 (ref 1.7–7)
NEUTROPHILS NFR BLD AUTO: 67.6 % (ref 42.7–76)
NRBC BLD AUTO-RTO: 0.1 /100 WBC (ref 0–0.2)
PLATELET # BLD AUTO: 585 10*3/MM3 (ref 140–450)
PMV BLD AUTO: 7.3 FL (ref 6–12)
POTASSIUM BLD-SCNC: 3.2 MMOL/L (ref 3.5–5.2)
POTASSIUM BLD-SCNC: 4.4 MMOL/L (ref 3.5–5.2)
PROT SERPL-MCNC: 7.2 G/DL (ref 6–8.5)
RBC # BLD AUTO: 3.19 10*6/MM3 (ref 3.77–5.28)
SODIUM BLD-SCNC: 138 MMOL/L (ref 136–145)
WBC NRBC COR # BLD: 12.1 10*3/MM3 (ref 3.4–10.8)

## 2019-10-31 PROCEDURE — 85025 COMPLETE CBC W/AUTO DIFF WBC: CPT | Performed by: INTERNAL MEDICINE

## 2019-10-31 PROCEDURE — 80053 COMPREHEN METABOLIC PANEL: CPT | Performed by: INTERNAL MEDICINE

## 2019-10-31 PROCEDURE — 25010000002 CEFTRIAXONE PER 250 MG: Performed by: NURSE PRACTITIONER

## 2019-10-31 PROCEDURE — 84132 ASSAY OF SERUM POTASSIUM: CPT | Performed by: INTERNAL MEDICINE

## 2019-10-31 PROCEDURE — 25010000002 ENOXAPARIN PER 10 MG: Performed by: SURGERY

## 2019-10-31 PROCEDURE — 94799 UNLISTED PULMONARY SVC/PX: CPT

## 2019-10-31 PROCEDURE — 99232 SBSQ HOSP IP/OBS MODERATE 35: CPT | Performed by: INTERNAL MEDICINE

## 2019-10-31 RX ORDER — IPRATROPIUM BROMIDE AND ALBUTEROL SULFATE 2.5; .5 MG/3ML; MG/3ML
3 SOLUTION RESPIRATORY (INHALATION) EVERY 4 HOURS PRN
Status: DISCONTINUED | OUTPATIENT
Start: 2019-10-31 | End: 2019-11-03 | Stop reason: HOSPADM

## 2019-10-31 RX ADMIN — PAROXETINE HYDROCHLORIDE 40 MG: 20 TABLET, FILM COATED ORAL at 08:10

## 2019-10-31 RX ADMIN — CEFTRIAXONE SODIUM 2 G: 2 INJECTION, POWDER, FOR SOLUTION INTRAMUSCULAR; INTRAVENOUS at 20:52

## 2019-10-31 RX ADMIN — FAMOTIDINE 20 MG: 10 INJECTION, SOLUTION INTRAVENOUS at 20:51

## 2019-10-31 RX ADMIN — ACETAMINOPHEN 650 MG: 325 TABLET ORAL at 12:23

## 2019-10-31 RX ADMIN — Medication 10 ML: at 21:07

## 2019-10-31 RX ADMIN — HYDROCODONE BITARTRATE AND ACETAMINOPHEN 1 TABLET: 10; 325 TABLET ORAL at 08:10

## 2019-10-31 RX ADMIN — ENOXAPARIN SODIUM 40 MG: 40 INJECTION SUBCUTANEOUS at 17:30

## 2019-10-31 RX ADMIN — ACETAMINOPHEN 650 MG: 325 TABLET ORAL at 17:30

## 2019-10-31 RX ADMIN — POTASSIUM CHLORIDE 40 MEQ: 1500 TABLET, EXTENDED RELEASE ORAL at 12:08

## 2019-10-31 RX ADMIN — POTASSIUM CHLORIDE 40 MEQ: 1500 TABLET, EXTENDED RELEASE ORAL at 07:57

## 2019-10-31 RX ADMIN — FAMOTIDINE 20 MG: 10 INJECTION, SOLUTION INTRAVENOUS at 08:10

## 2019-10-31 RX ADMIN — HYDROCODONE BITARTRATE AND ACETAMINOPHEN 1 TABLET: 10; 325 TABLET ORAL at 20:51

## 2019-10-31 RX ADMIN — Medication 3 ML: at 08:15

## 2019-11-01 LAB
ALBUMIN SERPL-MCNC: 3.3 G/DL (ref 3.5–5.2)
ALBUMIN/GLOB SERPL: 0.8 G/DL
ALP SERPL-CCNC: 126 U/L (ref 39–117)
ALT SERPL W P-5'-P-CCNC: 19 U/L (ref 1–33)
ANION GAP SERPL CALCULATED.3IONS-SCNC: 9 MMOL/L (ref 5–15)
AST SERPL-CCNC: 27 U/L (ref 1–32)
BASOPHILS # BLD AUTO: 0.1 10*3/MM3 (ref 0–0.2)
BASOPHILS NFR BLD AUTO: 0.9 % (ref 0–1.5)
BILIRUB SERPL-MCNC: 0.2 MG/DL (ref 0.2–1.2)
BUN BLD-MCNC: 12 MG/DL (ref 6–20)
BUN/CREAT SERPL: 19.7 (ref 7–25)
CALCIUM SPEC-SCNC: 9.1 MG/DL (ref 8.6–10.5)
CHLORIDE SERPL-SCNC: 101 MMOL/L (ref 98–107)
CO2 SERPL-SCNC: 27 MMOL/L (ref 22–29)
CREAT BLD-MCNC: 0.61 MG/DL (ref 0.57–1)
DEPRECATED RDW RBC AUTO: 48.6 FL (ref 37–54)
EOSINOPHIL # BLD AUTO: 0.5 10*3/MM3 (ref 0–0.4)
EOSINOPHIL NFR BLD AUTO: 6.4 % (ref 0.3–6.2)
ERYTHROCYTE [DISTWIDTH] IN BLOOD BY AUTOMATED COUNT: 16.1 % (ref 12.3–15.4)
GFR SERPL CREATININE-BSD FRML MDRD: 107 ML/MIN/1.73
GLOBULIN UR ELPH-MCNC: 4 GM/DL
GLUCOSE BLD-MCNC: 89 MG/DL (ref 65–99)
HCT VFR BLD AUTO: 29.8 % (ref 34–46.6)
HGB BLD-MCNC: 9.8 G/DL (ref 12–15.9)
LYMPHOCYTES # BLD AUTO: 1.8 10*3/MM3 (ref 0.7–3.1)
LYMPHOCYTES NFR BLD AUTO: 20.9 % (ref 19.6–45.3)
MCH RBC QN AUTO: 28 PG (ref 26.6–33)
MCHC RBC AUTO-ENTMCNC: 33 G/DL (ref 31.5–35.7)
MCV RBC AUTO: 84.8 FL (ref 79–97)
MONOCYTES # BLD AUTO: 0.9 10*3/MM3 (ref 0.1–0.9)
MONOCYTES NFR BLD AUTO: 10.4 % (ref 5–12)
NEUTROPHILS # BLD AUTO: 5.2 10*3/MM3 (ref 1.7–7)
NEUTROPHILS NFR BLD AUTO: 61.4 % (ref 42.7–76)
NRBC BLD AUTO-RTO: 0 /100 WBC (ref 0–0.2)
PLATELET # BLD AUTO: 617 10*3/MM3 (ref 140–450)
PMV BLD AUTO: 7.5 FL (ref 6–12)
POTASSIUM BLD-SCNC: 4.2 MMOL/L (ref 3.5–5.2)
PROT SERPL-MCNC: 7.3 G/DL (ref 6–8.5)
RBC # BLD AUTO: 3.51 10*6/MM3 (ref 3.77–5.28)
SODIUM BLD-SCNC: 137 MMOL/L (ref 136–145)
WBC NRBC COR # BLD: 8.5 10*3/MM3 (ref 3.4–10.8)

## 2019-11-01 PROCEDURE — 25010000002 CEFTRIAXONE PER 250 MG: Performed by: NURSE PRACTITIONER

## 2019-11-01 PROCEDURE — 80053 COMPREHEN METABOLIC PANEL: CPT | Performed by: INTERNAL MEDICINE

## 2019-11-01 PROCEDURE — 99024 POSTOP FOLLOW-UP VISIT: CPT | Performed by: PHYSICIAN ASSISTANT

## 2019-11-01 PROCEDURE — 85025 COMPLETE CBC W/AUTO DIFF WBC: CPT | Performed by: INTERNAL MEDICINE

## 2019-11-01 PROCEDURE — 25010000002 ENOXAPARIN PER 10 MG: Performed by: SURGERY

## 2019-11-01 PROCEDURE — 99232 SBSQ HOSP IP/OBS MODERATE 35: CPT | Performed by: INTERNAL MEDICINE

## 2019-11-01 RX ORDER — POLYETHYLENE GLYCOL 3350 17 G/17G
17 POWDER, FOR SOLUTION ORAL 2 TIMES DAILY
Status: DISCONTINUED | OUTPATIENT
Start: 2019-11-01 | End: 2019-11-03 | Stop reason: HOSPADM

## 2019-11-01 RX ADMIN — FAMOTIDINE 20 MG: 10 INJECTION, SOLUTION INTRAVENOUS at 22:24

## 2019-11-01 RX ADMIN — POLYETHYLENE GLYCOL 3350 17 G: 17 POWDER, FOR SOLUTION ORAL at 22:28

## 2019-11-01 RX ADMIN — Medication 3 ML: at 09:15

## 2019-11-01 RX ADMIN — PAROXETINE HYDROCHLORIDE 40 MG: 20 TABLET, FILM COATED ORAL at 09:15

## 2019-11-01 RX ADMIN — HYDROCODONE BITARTRATE AND ACETAMINOPHEN 1 TABLET: 10; 325 TABLET ORAL at 09:15

## 2019-11-01 RX ADMIN — ENOXAPARIN SODIUM 40 MG: 40 INJECTION SUBCUTANEOUS at 16:12

## 2019-11-01 RX ADMIN — POLYETHYLENE GLYCOL 3350 17 G: 17 POWDER, FOR SOLUTION ORAL at 16:12

## 2019-11-01 RX ADMIN — ACETAMINOPHEN 650 MG: 325 TABLET ORAL at 22:24

## 2019-11-01 RX ADMIN — HYDROCODONE BITARTRATE AND ACETAMINOPHEN 1 TABLET: 10; 325 TABLET ORAL at 12:59

## 2019-11-01 RX ADMIN — FAMOTIDINE 20 MG: 10 INJECTION, SOLUTION INTRAVENOUS at 09:15

## 2019-11-01 RX ADMIN — CEFTRIAXONE SODIUM 2 G: 2 INJECTION, POWDER, FOR SOLUTION INTRAMUSCULAR; INTRAVENOUS at 22:24

## 2019-11-01 NOTE — PROGRESS NOTES
Continued Stay Note  KISHORE Rivers     Patient Name: Jolene Burnham  MRN: [id number]  Today's Date: 11/1/2019    Admit Date: 10/26/2019    Discharge Plan     Row Name 11/01/19 1327       Plan    Plan  Anticipate routine home     Plan Comments  DC barriers: Waiting on patient to have BM, diet has been advanced, per ID abx can be changed to PO on discharge.           Expected Discharge Date and Time     Expected Discharge Date Expected Discharge Time    Nov 2, 2019             Pauly Camacho RN

## 2019-11-01 NOTE — PROGRESS NOTES
LOS: 5 days   Patient Care Team:  José Antonio Gaspar MD as PCP - General  José Antonio Gaspar MD as PCP - Family Medicine    Chief Complaint: She has much less nausea today    Subjective      The patient has been afebrile for the last 24 hours.  The patient now on room air, hemodynamically stable, and is tolerating antimicrobial therapy.    Review of Systems:   Review of Systems   HENT: Negative.    Respiratory:        Much improved   Gastrointestinal: Positive for abdominal pain.   Genitourinary: Negative.    Musculoskeletal: Positive for arthralgias and back pain.   Skin: Negative.    Neurological: Negative.         Objective     Vital Signs  Temp:  [98.3 °F (36.8 °C)-98.7 °F (37.1 °C)] 98.3 °F (36.8 °C)  Heart Rate:  [70-80] 80  Resp:  [14-16] 15  BP: (101-106)/(65-67) 104/67    Physical Exam:  Physical Exam   Constitutional: She is oriented to person, place, and time. She appears well-developed and well-nourished.   HENT:   Head: Normocephalic and atraumatic.   Eyes: Conjunctivae and EOM are normal. Pupils are equal, round, and reactive to light.   Neck: Neck supple.   Cardiovascular: Normal rate, regular rhythm and normal heart sounds.   Pulmonary/Chest: Effort normal and breath sounds normal.   Abdominal: Soft. Bowel sounds are normal.   Abdominal midline incision appears to be healing well, MORENA drain with serosanguineous drainage    Musculoskeletal: Normal range of motion.   Neurological: She is alert and oriented to person, place, and time.   Skin: Skin is warm and dry.   Psychiatric: She has a normal mood and affect.   Vitals reviewed.       Results Review:    I have reviewed all clinical data, test, lab, and imaging results.     Radiology  No Radiology Exams Resulted Within Past 24 Hours    Cardiology      Laboratory  Lab Results (last 24 hours)     Procedure Component Value Units Date/Time    Comprehensive Metabolic Panel [[id number]]  (Abnormal) Collected:  11/01/19 0401    Specimen:  Blood Updated:   11/01/19 0537     Glucose 89 mg/dL      BUN 12 mg/dL      Creatinine 0.61 mg/dL      Sodium 137 mmol/L      Potassium 4.2 mmol/L      Chloride 101 mmol/L      CO2 27.0 mmol/L      Calcium 9.1 mg/dL      Total Protein 7.3 g/dL      Albumin 3.30 g/dL      ALT (SGPT) 19 U/L      AST (SGOT) 27 U/L      Alkaline Phosphatase 126 U/L      Total Bilirubin 0.2 mg/dL      eGFR Non African Amer 107 mL/min/1.73      Globulin 4.0 gm/dL      A/G Ratio 0.8 g/dL      BUN/Creatinine Ratio 19.7     Anion Gap 9.0 mmol/L     Narrative:       GFR Normal >60  Chronic Kidney Disease <60  Kidney Failure <15    CBC & Differential [[id number]] Collected:  11/01/19 0401    Specimen:  Blood Updated:  11/01/19 0449    Narrative:       The following orders were created for panel order CBC & Differential.  Procedure                               Abnormality         Status                     ---------                               -----------         ------                     CBC Auto Differential[[id number]]        Abnormal            Final result                 Please view results for these tests on the individual orders.    CBC Auto Differential [[id number]]  (Abnormal) Collected:  11/01/19 0401    Specimen:  Blood Updated:  11/01/19 0449     WBC 8.50 10*3/mm3      RBC 3.51 10*6/mm3      Hemoglobin 9.8 g/dL      Hematocrit 29.8 %      MCV 84.8 fL      MCH 28.0 pg      MCHC 33.0 g/dL      RDW 16.1 %      RDW-SD 48.6 fl      MPV 7.5 fL      Platelets 617 10*3/mm3      Neutrophil % 61.4 %      Lymphocyte % 20.9 %      Monocyte % 10.4 %      Eosinophil % 6.4 %      Basophil % 0.9 %      Neutrophils, Absolute 5.20 10*3/mm3      Lymphocytes, Absolute 1.80 10*3/mm3      Monocytes, Absolute 0.90 10*3/mm3      Eosinophils, Absolute 0.50 10*3/mm3      Basophils, Absolute 0.10 10*3/mm3      nRBC 0.0 /100 WBC     Potassium [[id number]]  (Normal) Collected:  10/31/19 2601    Specimen:  Blood Updated:  10/31/19 1855     Potassium 4.4 mmol/L            Microbiology     Microbiology Results (last 10 days)     Procedure Component Value - Date/Time    Respiratory Panel, PCR - Swab, Nasopharynx [[id number]]  (Normal) Collected:  10/27/19 2218    Lab Status:  Final result Specimen:  Swab from Nasopharynx Updated:  10/27/19 0372     ADENOVIRUS, PCR Not Detected     Coronavirus 229E Not Detected     Coronavirus HKU1 Not Detected     Coronavirus NL63 Not Detected     Coronavirus OC43 Not Detected     Human Metapneumovirus Not Detected     Human Rhinovirus/Enterovirus Not Detected     Influenza B PCR Not Detected     Parainfluenza Virus 1 Not Detected     Parainfluenza Virus 2 Not Detected     Parainfluenza Virus 3 Not Detected     Parainfluenza Virus 4 Not Detected     Bordetella pertussis pcr Not Detected     Influenza A H1 2009 PCR Not Detected     Chlamydophila pneumoniae PCR Not Detected     Mycoplasma pneumo by PCR Not Detected     Influenza A PCR Not Detected     Influenza A H3 Not Detected     Influenza A H1 Not Detected     RSV, PCR Not Detected    S. Pneumo Ag Urine or CSF - Urine, Urine, Clean Catch [[id number]]  (Normal) Collected:  10/27/19 2217    Lab Status:  Final result Specimen:  Urine, Clean Catch Updated:  10/27/19 2309     Strep Pneumo Ag Negative    Legionella Antigen, Urine - Urine, Urine, Clean Catch [[id number]]  (Normal) Collected:  10/27/19 2217    Lab Status:  Final result Specimen:  Urine, Clean Catch Updated:  10/27/19 2309     LEGIONELLA ANTIGEN, URINE Negative    Chlamydia trachomatis, Neisseria gonorrhoeae, PCR - Urine, Urine, Clean Catch [[id number]]  (Normal) Collected:  10/27/19 2217    Lab Status:  Final result Specimen:  Urine, Clean Catch Updated:  10/28/19 0041     Chlamydia DNA by PCR Not Detected     Neisseria gonorrhoeae by PCR Not Detected    Anaerobic Culture - Peritoneal Fluid, Peritoneum [[id number]] Collected:  10/26/19 1825    Lab Status:  Final result Specimen:  Peritoneal Fluid from Peritoneum Updated:  10/31/19 1017      "Anaerobic Culture No anaerobes isolated at 5 days    Body Fluid Culture - Peritoneal Fluid, Peritoneum [[id number]]  (Abnormal)  (Susceptibility) Collected:  10/26/19 1825    Lab Status:  Final result Specimen:  Peritoneal Fluid from Peritoneum Updated:  10/28/19 0850     Body Fluid Culture Scant growth (1+) Escherichia coli     Gram Stain Many (4+) WBCs per low power field      No organisms seen    Susceptibility      Escherichia coli     JANNY     Ampicillin Susceptible     Ampicillin + Sulbactam Susceptible     Cefazolin Susceptible     Cefepime Susceptible     Ceftazidime Susceptible     Ceftriaxone Susceptible     Gentamicin Susceptible     Levofloxacin Resistant     Piperacillin + Tazobactam Susceptible     Trimethoprim + Sulfamethoxazole Resistant                    Blood Culture - Blood, Arm, Right [[id number]] Collected:  10/26/19 0836    Lab Status:  Final result Specimen:  Blood from Arm, Right Updated:  10/31/19 0900     Blood Culture No growth at 5 days    Blood Culture - Blood, Arm, Left [[id number]] Collected:  10/26/19 0809    Lab Status:  Final result Specimen:  Blood from Arm, Left Updated:  10/31/19 0830     Blood Culture No growth at 5 days          Medication Review:     Hospital Medications (active)       Dose Frequency Start End    !Vancomycin Level Draw Needed  Once 10/30/2019     Sig - Route: 1 (One) Time. - Does not apply    !Vancomycin Level Draw Needed  Once 10/30/2019     Sig - Route: 1 (One) Time. - Does not apply    acetaminophen (TYLENOL) 160 MG/5ML solution 650 mg 650 mg Every 4 Hours PRN 10/26/2019     Sig - Route: Take 20.3 mL by mouth Every 4 (Four) Hours As Needed for Mild Pain . - Oral    Linked Group 1:  \"Or\" Linked Group Details        acetaminophen (TYLENOL) suppository 650 mg 650 mg Every 4 Hours PRN 10/26/2019     Sig - Route: Insert 1 suppository into the rectum Every 4 (Four) Hours As Needed for Mild Pain . - Rectal    Linked Group 1:  \"Or\" Linked Group Details        " "acetaminophen (TYLENOL) tablet 650 mg 650 mg Every 4 Hours PRN 10/26/2019     Sig - Route: Take 2 tablets by mouth Every 4 (Four) Hours As Needed for Mild Pain . - Oral    Linked Group 1:  \"Or\" Linked Group Details        bisacodyl (DULCOLAX) EC tablet 10 mg 10 mg Daily PRN 10/26/2019     Sig - Route: Take 2 tablets by mouth Daily As Needed for Constipation. - Oral    butalbital-acetaminophen-caffeine (FIORICET, ESGIC) -40 MG per tablet 2 tablet 2 tablet Once 10/29/2019 10/29/2019    Sig - Route: Take 2 tablets by mouth 1 (One) Time. - Oral    docusate sodium (COLACE) capsule 100 mg 100 mg 2 Times Daily PRN 10/26/2019     Sig - Route: Take 1 capsule by mouth 2 (Two) Times a Day As Needed for Constipation. - Oral    enoxaparin (LOVENOX) syringe 40 mg 40 mg Daily 10/26/2019     Sig - Route: Inject 0.4 mL under the skin into the appropriate area as directed Daily. - Subcutaneous    famotidine (PEPCID) injection 20 mg 20 mg Every 12 Hours Scheduled 10/26/2019     Sig - Route: Infuse 2 mL into a venous catheter Every 12 (Twelve) Hours. - Intravenous    furosemide (LASIX) injection 40 mg 40 mg 2 Times Daily (Diuretics) 10/28/2019     Sig - Route: Infuse 4 mL into a venous catheter 2 (Two) Times a Day. - Intravenous    HYDROcodone-acetaminophen (NORCO)  MG per tablet 1 tablet 1 tablet Every 4 Hours PRN 10/26/2019 11/5/2019    Sig - Route: Take 1 tablet by mouth Every 4 (Four) Hours As Needed for Severe Pain . - Oral    Cosign for Ordering: Accepted by Jessica Taylor MD on 10/27/2019 11:01 AM    HYDROcodone-acetaminophen (NORCO) 5-325 MG per tablet 1 tablet 1 tablet Every 4 Hours PRN 10/26/2019 11/5/2019    Sig - Route: Take 1 tablet by mouth Every 4 (Four) Hours As Needed for Moderate Pain . - Oral    Cosign for Ordering: Accepted by Jessica Taylor MD on 10/27/2019 11:01 AM    HYDROmorphone (DILAUDID) injection 0.5 mg 0.5 mg Every 2 Hours PRN 10/27/2019 11/5/2019    Sig - Route: Infuse 0.25 mL into a " "venous catheter Every 2 (Two) Hours As Needed for Severe Pain . - Intravenous    Linked Group 2:  \"And\" Linked Group Details        ibuprofen (ADVIL,MOTRIN) tablet 200 mg 200 mg Every 6 Hours PRN 10/28/2019     Sig - Route: Take 1 tablet by mouth Every 6 (Six) Hours As Needed for Mild Pain . - Oral    ipratropium-albuterol (DUO-NEB) nebulizer solution 3 mL 3 mL Every 4 Hours - RT 10/27/2019     Sig - Route: Take 3 mL by nebulization Every 4 (Four) Hours. - Nebulization    magnesium hydroxide (MILK OF MAGNESIA) suspension 2400 mg/10mL 10 mL 10 mL Daily PRN 10/26/2019     Sig - Route: Take 10 mL by mouth Daily As Needed for Constipation. - Oral    Magnesium Sulfate 2 gram / 50mL Infusion (GIVE X 3 BAGS TO EQUAL 6GM TOTAL DOSE) - Mg 1.1 - 1.5 mg/dl 2 g As Needed 10/26/2019     Sig - Route: Infuse 50 mL into a venous catheter As Needed (See Administration Instructions). - Intravenous    Linked Group 3:  \"Or\" Linked Group Details        Magnesium Sulfate 2 gram Bolus, followed by 8 gram infusion (total Mg dose 10 grams)- Mg less than or equal to 1mg/dL 2 g As Needed 10/26/2019     Sig - Route: Infuse 50 mL into a venous catheter As Needed (See Administration Instructions). - Intravenous    Linked Group 3:  \"Or\" Linked Group Details        Magnesium Sulfate 4 gram infusion- Mg 1.6-1.9 mg/dL 4 g As Needed 10/26/2019     Sig - Route: Infuse 100 mL into a venous catheter As Needed (See Administration Instructions). - Intravenous    Linked Group 3:  \"Or\" Linked Group Details        melatonin tablet 5 mg 5 mg Nightly PRN 10/26/2019     Sig - Route: Take 1 tablet by mouth At Night As Needed for Sleep. - Oral    meropenem (MERREM) 500 mg in sodium chloride 0.9 % 100 mL IVPB 500 mg Every 6 Hours 10/28/2019 11/4/2019    Sig - Route: Infuse 500 mg into a venous catheter Every 6 (Six) Hours. - Intravenous    naloxone (NARCAN) injection 0.1 mg 0.1 mg Every 5 Minutes PRN 10/27/2019     Sig - Route: Infuse 0.25 mL into a venous " "catheter Every 5 (Five) Minutes As Needed for Respiratory Depression. - Intravenous    Linked Group 2:  \"And\" Linked Group Details        naloxone (NARCAN) injection 0.4 mg 0.4 mg Every 5 Minutes PRN 10/26/2019     Sig - Route: Infuse 1 mL into a venous catheter Every 5 (Five) Minutes As Needed for Respiratory Depression. - Intravenous    Linked Group 4:  \"And\" Linked Group Details        ondansetron (ZOFRAN) injection 4 mg 4 mg Every 6 Hours PRN 10/26/2019     Sig - Route: Infuse 2 mL into a venous catheter Every 6 (Six) Hours As Needed for Nausea or Vomiting. - Intravenous    Linked Group 5:  \"Or\" Linked Group Details        ondansetron (ZOFRAN) tablet 4 mg 4 mg Every 6 Hours PRN 10/26/2019     Sig - Route: Take 1 tablet by mouth Every 6 (Six) Hours As Needed for Nausea or Vomiting. - Oral    Linked Group 5:  \"Or\" Linked Group Details        PARoxetine (PAXIL) tablet 40 mg 40 mg Daily 10/27/2019     Sig - Route: Take 2 tablets by mouth Daily. - Oral    Pharmacy to dose vancomycin  Continuous PRN 10/28/2019 11/4/2019    Sig - Route: Continuous As Needed (PNA). - Does not apply    potassium & sodium phosphates (PHOS-NAK) 280-160-250 MG packet - for Phosphorus 1.25 - 2.5 mg/dL 2 packet Every 6 Hours PRN 10/26/2019     Sig - Route: Take 2 packets by mouth Every 6 (Six) Hours As Needed (Phosphorus 1.25 - 2.5 mg/dL). - Oral    Linked Group 6:  \"Or\" Linked Group Details        potassium & sodium phosphates (PHOS-NAK) 280-160-250 MG packet - for Phosphorus less than 1.25 mg/dL 2 packet Every 6 Hours PRN 10/26/2019     Sig - Route: Take 2 packets by mouth Every 6 (Six) Hours As Needed (Phosphorus less than 1.25 mg/dL). - Oral    Linked Group 6:  \"Or\" Linked Group Details        potassium chloride (K-DUR,KLOR-CON) CR tablet 40 mEq 40 mEq As Needed 10/26/2019     Sig - Route: Take 2 tablets by mouth As Needed (Potassium Replacement.  See Admin Instructions). - Oral    potassium chloride (KLOR-CON) packet 40 mEq 40 mEq As " "Needed 10/26/2019     Sig - Route: Take 40 mEq by mouth As Needed (Potassium Replacement, See Admin Instructions). - Oral    promethazine (PHENERGAN) injection 12.5 mg 12.5 mg Every 6 Hours PRN 10/26/2019     Sig - Route: Inject 0.5 mL into the appropriate muscle as directed by prescriber Every 6 (Six) Hours As Needed for Nausea or Vomiting. - Intramuscular    Linked Group 7:  \"Or\" Linked Group Details        promethazine (PHENERGAN) IVPB 12.5 mg 12.5 mg Every 6 Hours PRN 10/26/2019     Sig - Route: Infuse 50 mL into a venous catheter Every 6 (Six) Hours As Needed for Nausea or Vomiting. - Intravenous    Linked Group 7:  \"Or\" Linked Group Details        sodium chloride 0.9 % flush 10 mL 10 mL Every 12 Hours Scheduled 10/26/2019     Sig - Route: Infuse 10 mL into a venous catheter Every 12 (Twelve) Hours. - Intravenous    sodium chloride 0.9 % flush 10 mL 10 mL As Needed 10/26/2019     Sig - Route: Infuse 10 mL into a venous catheter As Needed for Line Care. - Intravenous    sodium chloride 0.9 % flush 3 mL 3 mL Every 12 Hours Scheduled 10/26/2019     Sig - Route: Infuse 3 mL into a venous catheter Every 12 (Twelve) Hours. - Intravenous    sodium chloride 0.9 % flush 3-10 mL 3-10 mL As Needed 10/26/2019     Sig - Route: Infuse 3-10 mL into a venous catheter As Needed for Line Care. - Intravenous    vancomycin 1000 mg/250 mL 0.9% NS IVPB ([facility]) 20 mg/kg × 53.2 kg (Adjusted) Once 10/28/2019 10/28/2019    Sig - Route: Infuse 250 mL into a venous catheter 1 (One) Time. - Intravenous    vancomycin 750 mg in sodium chloride 0.9 % 100 mL IVPB 15 mg/kg × 53.2 kg (Adjusted) Every 12 Hours 10/29/2019 11/1/2019    Sig - Route: Infuse 750 mg into a venous catheter Every 12 (Twelve) Hours. - Intravenous    dextrose 5 % and sodium chloride 0.45 % infusion (Discontinued) 125 mL/hr Continuous 10/26/2019 10/28/2019    Sig - Route: Infuse 125 mL/hr into a venous catheter Continuous. - Intravenous    Reason for Discontinue: Discontinued " "by another clinician    doxycycline (VIBRAMYCIN) 100 mg in sodium chloride 0.9 % 100 mL IVPB (Discontinued) 100 mg Every 12 Hours 10/26/2019 10/28/2019    Sig - Route: Infuse 100 mg into a venous catheter Every 12 (Twelve) Hours. - Intravenous    piperacillin-tazobactam (ZOSYN) IVPB 3.375 g in 100 mL NS (CD) (Discontinued) 3.375 g Every 6 Hours 10/26/2019 10/28/2019    Sig - Route: Infuse 3.375 g into a venous catheter Every 6 (Six) Hours. - Intravenous    sodium chloride 0.9 % flush 10 mL (Discontinued) 10 mL As Needed 10/26/2019 10/28/2019    Sig - Route: Infuse 10 mL into a venous catheter As Needed for Line Care. - Intravenous    Reason for Discontinue: Discontinued by another clinician    Linked Group 8:  \"And\" Linked Group Details                  Assessment/Plan     Antimicrobial Therapy   1 IV Rocephin      Assessment     Intra-abdominal/pelvic abscesses/ovarian abscess  -Intraoperative cultures was positive for E. coli.  Urine was negative for chlamydia and gonorrhea PCR.  The patient was treated initially with broad-spectrum antibiotics.  She was switch to IV Rocephin and IV Flagyl but she was not able to tolerate IV Flagyl secondary to severe nausea and vomiting     Hypoxia noted after admission.  Most likely secondary to fluid overload.  Patient improved significantly with diuresis and was on 15 L of oxygen and currently on 2 L     Patient has a history of ulcerative colitis and IBS with constipation  -Patient is not on any immunosuppressive medications     History of  and appendectomy     Sjogern's syndrome     Tobacco abuse           Plan     Continue IV Rocephin 2 g every 24 hours-May switch to p.o. Omnicef 300mg q12h at discharge for 2 weeks total treatment   Continue supportive care  Okay to discharge infectious disease standpoint when okay with general surgery    Pippa Amezquita MD  19  2:29 PM    Note is dictated utilizing voice recognition software/Dragon  "

## 2019-11-01 NOTE — PROGRESS NOTES
General Surgery Progress Note     LOS: 5 days   Patient Care Team:  José Antonio Gaspar MD as PCP - General  José Antonio Gaspar MD as PCP - Family Medicine    Subjective     Chief Complaint   Patient presents with   • Abdominal Pain     abdominal pain with no bowel movement x 6 days       Interval History:   Feeling well  No complaints  Has passed gas, still awaiting bowel movement    Objective     Vital Signs  Temp:  [98.3 °F (36.8 °C)-98.7 °F (37.1 °C)] 98.3 °F (36.8 °C)  Heart Rate:  [70-80] 80  Resp:  [14-16] 15  BP: (101-106)/(65-67) 104/67    Physical Exam   Constitutional: She appears well-developed and well-nourished.   HENT:   Head: Normocephalic and atraumatic.   Eyes: EOM are normal.   Neck: Normal range of motion.   Cardiovascular: Normal rate.   Mild pallor; no cyanosis   Pulmonary/Chest: Effort normal. No respiratory distress.   Abdominal: Soft.   MORENA serosanguineous  Incision clean and dry   Neurological: She is alert.   Skin: Skin is warm and dry.   Psychiatric: She has a normal mood and affect. Her behavior is normal.   Vitals reviewed.           Results Review:    Lab Results (last 24 hours)     Procedure Component Value Units Date/Time    Comprehensive Metabolic Panel [[id number]]  (Abnormal) Collected:  11/01/19 0401    Specimen:  Blood Updated:  11/01/19 0537     Glucose 89 mg/dL      BUN 12 mg/dL      Creatinine 0.61 mg/dL      Sodium 137 mmol/L      Potassium 4.2 mmol/L      Chloride 101 mmol/L      CO2 27.0 mmol/L      Calcium 9.1 mg/dL      Total Protein 7.3 g/dL      Albumin 3.30 g/dL      ALT (SGPT) 19 U/L      AST (SGOT) 27 U/L      Alkaline Phosphatase 126 U/L      Total Bilirubin 0.2 mg/dL      eGFR Non African Amer 107 mL/min/1.73      Globulin 4.0 gm/dL      A/G Ratio 0.8 g/dL      BUN/Creatinine Ratio 19.7     Anion Gap 9.0 mmol/L     Narrative:       GFR Normal >60  Chronic Kidney Disease <60  Kidney Failure <15    CBC & Differential [[id number]] Collected:  11/01/19 0401    Specimen:  Blood  Updated:  11/01/19 0449    Narrative:       The following orders were created for panel order CBC & Differential.  Procedure                               Abnormality         Status                     ---------                               -----------         ------                     CBC Auto Differential[[id number]]        Abnormal            Final result                 Please view results for these tests on the individual orders.    CBC Auto Differential [[id number]]  (Abnormal) Collected:  11/01/19 0401    Specimen:  Blood Updated:  11/01/19 0449     WBC 8.50 10*3/mm3      RBC 3.51 10*6/mm3      Hemoglobin 9.8 g/dL      Hematocrit 29.8 %      MCV 84.8 fL      MCH 28.0 pg      MCHC 33.0 g/dL      RDW 16.1 %      RDW-SD 48.6 fl      MPV 7.5 fL      Platelets 617 10*3/mm3      Neutrophil % 61.4 %      Lymphocyte % 20.9 %      Monocyte % 10.4 %      Eosinophil % 6.4 %      Basophil % 0.9 %      Neutrophils, Absolute 5.20 10*3/mm3      Lymphocytes, Absolute 1.80 10*3/mm3      Monocytes, Absolute 0.90 10*3/mm3      Eosinophils, Absolute 0.50 10*3/mm3      Basophils, Absolute 0.10 10*3/mm3      nRBC 0.0 /100 WBC     Potassium [[id number]]  (Normal) Collected:  10/31/19 1754    Specimen:  Blood Updated:  10/31/19 1855     Potassium 4.4 mmol/L         Imaging Results (Last 24 Hours)     ** No results found for the last 24 hours. **          I reviewed the patient's new clinical results.    Medication Review:    Current Facility-Administered Medications:   •  acetaminophen (TYLENOL) tablet 650 mg, 650 mg, Oral, Q4H PRN, 650 mg at 10/31/19 1730 **OR** acetaminophen (TYLENOL) 160 MG/5ML solution 650 mg, 650 mg, Oral, Q4H PRN **OR** acetaminophen (TYLENOL) suppository 650 mg, 650 mg, Rectal, Q4H PRN, Odilon Velez MD  •  bisacodyl (DULCOLAX) EC tablet 10 mg, 10 mg, Oral, Daily PRN, Jessica Taylor MD  •  cefTRIAXone (ROCEPHIN) 2 g in sodium chloride 0.9 % 100 mL IVPB, 2 g, Intravenous, Q24H, [facility],  YOUSUF Shaw, Last Rate: 200 mL/hr at 10/31/19 2052, 2 g at 10/31/19 2052  •  docusate sodium (COLACE) capsule 100 mg, 100 mg, Oral, BID PRN, Jessica Taylor MD  •  enoxaparin (LOVENOX) syringe 40 mg, 40 mg, Subcutaneous, Daily, Jessica Taylor MD, 40 mg at 10/31/19 1730  •  famotidine (PEPCID) injection 20 mg, 20 mg, Intravenous, Q12H, Odilon Velez MD, 20 mg at 11/01/19 0915  •  HYDROcodone-acetaminophen (NORCO)  MG per tablet 1 tablet, 1 tablet, Oral, Q4H PRN, Jessica Taylor MD, 1 tablet at 11/01/19 1259  •  HYDROcodone-acetaminophen (NORCO) 5-325 MG per tablet 1 tablet, 1 tablet, Oral, Q4H PRN, Jessica Taylor MD  •  HYDROmorphone (DILAUDID) injection 0.5 mg, 0.5 mg, Intravenous, Q2H PRN **AND** naloxone (NARCAN) injection 0.1 mg, 0.1 mg, Intravenous, Q5 Min PRN, Odilon Velez MD  •  ibuprofen (ADVIL,MOTRIN) tablet 200 mg, 200 mg, Oral, Q6H PRN, Jhonatan Jung Jr., MD, 200 mg at 10/29/19 1345  •  ipratropium-albuterol (DUO-NEB) nebulizer solution 3 mL, 3 mL, Nebulization, Q4H PRN, Jhonatan Jung Jr., MD  •  magnesium hydroxide (MILK OF MAGNESIA) suspension 2400 mg/10mL 10 mL, 10 mL, Oral, Daily PRN, Jessica Taylor MD  •  Magnesium Sulfate 2 gram Bolus, followed by 8 gram infusion (total Mg dose 10 grams)- Mg less than or equal to 1mg/dL, 2 g, Intravenous, PRN **OR** Magnesium Sulfate 2 gram / 50mL Infusion (GIVE X 3 BAGS TO EQUAL 6GM TOTAL DOSE) - Mg 1.1 - 1.5 mg/dl, 2 g, Intravenous, PRN **OR** Magnesium Sulfate 4 gram infusion- Mg 1.6-1.9 mg/dL, 4 g, Intravenous, PRN, Odilon Velez MD  •  melatonin tablet 5 mg, 5 mg, Oral, Nightly PRN, Odilon Vleez MD  •  [DISCONTINUED] Morphine sulfate (PF) injection 4 mg, 4 mg, Intravenous, Q2H PRN, 4 mg at 10/27/19 0720 **AND** naloxone (NARCAN) injection 0.4 mg, 0.4 mg, Intravenous, Q5 Min PRN, Jessica Taylor MD  •  ondansetron (ZOFRAN) tablet 4 mg, 4 mg, Oral, Q6H  PRN **OR** ondansetron (ZOFRAN) injection 4 mg, 4 mg, Intravenous, Q6H PRN, Jessica Taylor MD, 4 mg at 10/30/19 0746  •  PARoxetine (PAXIL) tablet 40 mg, 40 mg, Oral, Daily, Aye Lantigua, FNP, 40 mg at 11/01/19 0915  •  potassium & sodium phosphates (PHOS-NAK) 280-160-250 MG packet - for Phosphorus less than 1.25 mg/dL, 2 packet, Oral, Q6H PRN **OR** potassium & sodium phosphates (PHOS-NAK) 280-160-250 MG packet - for Phosphorus 1.25 - 2.5 mg/dL, 2 packet, Oral, Q6H PRN, Agustin, Odilon Gates MD  •  potassium chloride (K-DUR,KLOR-CON) CR tablet 40 mEq, 40 mEq, Oral, PRN, Agustin, Odilon Gates MD, 40 mEq at 10/31/19 1208  •  potassium chloride (KLOR-CON) packet 40 mEq, 40 mEq, Oral, PRN, Agustin, Odilon Gates MD  •  promethazine (PHENERGAN) IVPB 12.5 mg, 12.5 mg, Intravenous, Q6H PRN **OR** promethazine (PHENERGAN) injection 12.5 mg, 12.5 mg, Intramuscular, Q6H PRN, Jessica Taylor MD  •  sodium chloride 0.9 % flush 10 mL, 10 mL, Intravenous, Q12H, Odilon Velez MD, 10 mL at 10/31/19 2107  •  sodium chloride 0.9 % flush 10 mL, 10 mL, Intravenous, PRN, Odilon Velez MD  •  sodium chloride 0.9 % flush 3 mL, 3 mL, Intravenous, Q12H, Jessica Taylor MD, 3 mL at 11/01/19 0915  •  sodium chloride 0.9 % flush 3-10 mL, 3-10 mL, Intravenous, PRN, Jessica Taylor MD    Assessment/Plan     Active Problems:    Generalized abdominal pain  Status post ex lap, right salpingectomy POD 5    Surgically stable and feeling well  Await bowel movement  Dr. Tran will see patient today, and tomorrow    Plan for disposition: Likely home tomorrow. Follow-up in our office in 2 weeks    Antonia Albrecht PA-C  11/01/19  1:29 PM

## 2019-11-01 NOTE — PLAN OF CARE
Problem: Patient Care Overview  Goal: Plan of Care Review  Outcome: Ongoing (interventions implemented as appropriate)  Patient doing well. Ambulating and pain controlled with oral pain medication   11/01/19 7464   Coping/Psychosocial   Plan of Care Reviewed With patient     Goal: Individualization and Mutuality  Outcome: Ongoing (interventions implemented as appropriate)    Goal: Discharge Needs Assessment  Outcome: Ongoing (interventions implemented as appropriate)    Goal: Interprofessional Rounds/Family Conf  Outcome: Ongoing (interventions implemented as appropriate)      Problem: Pain, Chronic (Adult)  Goal: Identify Related Risk Factors and Signs and Symptoms  Outcome: Outcome(s) achieved Date Met: 11/01/19    Goal: Acceptable Pain/Comfort Level and Functional Ability  Outcome: Ongoing (interventions implemented as appropriate)      Problem: Infection, Risk/Actual (Adult)  Goal: Identify Related Risk Factors and Signs and Symptoms  Outcome: Outcome(s) achieved Date Met: 11/01/19    Goal: Infection Prevention/Resolution  Outcome: Ongoing (interventions implemented as appropriate)      Problem: Surgery Nonspecified (Adult)  Goal: Signs and Symptoms of Listed Potential Problems Will be Absent, Minimized or Managed (Surgery Nonspecified)  Outcome: Ongoing (interventions implemented as appropriate)    Goal: Anesthesia/Sedation Recovery  Outcome: Outcome(s) achieved Date Met: 11/01/19

## 2019-11-01 NOTE — PROGRESS NOTES
"KPA/PULM/CC PROGRESS NOTE       PATIENT IDENTIFICATION    Name: Jolene Burnham Age: 44 y.o. Sex: female :  1975 MRN: [id number]    SUBJECTIVE    Jolene Burnham is a 44 y.o. female who we were asked to see in consultation for acute hypoxic respiratory failure.  Patient presented to ER due to abdominal pain and nausea.  CT scan of the abdomen showed diffuse inflammation and bowel wall thickening.  General surgery was consulted and she was taken to the OR where she underwent exploratory laparotomy with right salpingectomy with drainage of pelvic abscess.   On 10/27/19 the patient developed acute hypoxic respiratory failure requiring increasing FIO2 to 4L with sats in the upper 80's.  CT scan of the chest was negative for PE but did raise the concern for pneumonia. Patient denies cough or dyspnea.  Denies any recent sick contacts.     10/29: Feels better today.  Oxygen requirement improving.  10/30: Feels better today bit nauseous.  Shortness of breath improving  : No new issues reported by the patient.  Remains on room air.  Started on a diet.    OBJECTIVE    /67 (BP Location: Right arm, Patient Position: Lying)   Pulse 80   Temp 98.3 °F (36.8 °C) (Oral)   Resp 15   Ht 152.4 cm (60\")   Wt 65.2 kg (143 lb 11.8 oz)   SpO2 96%   BMI 28.07 kg/m²   Intake/Output last 3 shifts:  I/O last 3 completed shifts:  In: 840 [P.O.:840]  Out: 990 [Urine:950; Drains:40]  Intake/Output this shift:  I/O this shift:  In: 480 [P.O.:480]  Out: 900 [Urine:900]    Constitutional:  Alert, no acute respiratory distress   HEENT:  Atraumatic, PERRL, conjunctiva normal, moist oral mucosa, no nasal discharge.  Trachea is midline.  Respiratory:  No respiratory distress, clear to auscultation  Cardiovascular:  Normal rate, normal rhythm and no murmurs.  Pulses 2+ and equal in all four extremities.    GI:  Soft, nondistended.  Surgical dressing in tact without strike through noted.    Extremities:   No " edema  Integument:  No rashes.   Neurologic:   Alert, awake, Oriented x 3, CN 2-12 normal, normal motor function, normal sensory function, no focal deficits noted   Psychiatric:  Speech and behavior appropriate     Scheduled Meds:    ceftriaxone 2 g Intravenous Q24H   enoxaparin 40 mg Subcutaneous Daily   famotidine 20 mg Intravenous Q12H   PARoxetine 40 mg Oral Daily   sodium chloride 10 mL Intravenous Q12H   sodium chloride 3 mL Intravenous Q12H       Continuous Infusions:       PRN Meds:•  acetaminophen **OR** acetaminophen **OR** acetaminophen  •  bisacodyl  •  docusate sodium  •  HYDROcodone-acetaminophen  •  HYDROcodone-acetaminophen  •  HYDROmorphone **AND** naloxone  •  ibuprofen  •  ipratropium-albuterol  •  magnesium hydroxide  •  magnesium sulfate **OR** magnesium sulfate **OR** magnesium sulfate  •  melatonin  •  [DISCONTINUED] Morphine **AND** naloxone  •  ondansetron **OR** ondansetron  •  potassium & sodium phosphates **OR** potassium & sodium phosphates  •  potassium chloride  •  potassium chloride  •  promethazine **OR** promethazine  •  sodium chloride  •  sodium chloride     Data Review:  Lab Results (last 24 hours)     Procedure Component Value Units Date/Time    Comprehensive Metabolic Panel [[id number]]  (Abnormal) Collected:  11/01/19 0401    Specimen:  Blood Updated:  11/01/19 0537     Glucose 89 mg/dL      BUN 12 mg/dL      Creatinine 0.61 mg/dL      Sodium 137 mmol/L      Potassium 4.2 mmol/L      Chloride 101 mmol/L      CO2 27.0 mmol/L      Calcium 9.1 mg/dL      Total Protein 7.3 g/dL      Albumin 3.30 g/dL      ALT (SGPT) 19 U/L      AST (SGOT) 27 U/L      Alkaline Phosphatase 126 U/L      Total Bilirubin 0.2 mg/dL      eGFR Non African Amer 107 mL/min/1.73      Globulin 4.0 gm/dL      A/G Ratio 0.8 g/dL      BUN/Creatinine Ratio 19.7     Anion Gap 9.0 mmol/L     Narrative:       GFR Normal >60  Chronic Kidney Disease <60  Kidney Failure <15    CBC & Differential [[id number]] Collected:   11/01/19 0401    Specimen:  Blood Updated:  11/01/19 0449    Narrative:       The following orders were created for panel order CBC & Differential.  Procedure                               Abnormality         Status                     ---------                               -----------         ------                     CBC Auto Differential[[id number]]        Abnormal            Final result                 Please view results for these tests on the individual orders.    CBC Auto Differential [[id number]]  (Abnormal) Collected:  11/01/19 0401    Specimen:  Blood Updated:  11/01/19 0449     WBC 8.50 10*3/mm3      RBC 3.51 10*6/mm3      Hemoglobin 9.8 g/dL      Hematocrit 29.8 %      MCV 84.8 fL      MCH 28.0 pg      MCHC 33.0 g/dL      RDW 16.1 %      RDW-SD 48.6 fl      MPV 7.5 fL      Platelets 617 10*3/mm3      Neutrophil % 61.4 %      Lymphocyte % 20.9 %      Monocyte % 10.4 %      Eosinophil % 6.4 %      Basophil % 0.9 %      Neutrophils, Absolute 5.20 10*3/mm3      Lymphocytes, Absolute 1.80 10*3/mm3      Monocytes, Absolute 0.90 10*3/mm3      Eosinophils, Absolute 0.50 10*3/mm3      Basophils, Absolute 0.10 10*3/mm3      nRBC 0.0 /100 WBC     Potassium [[id number]]  (Normal) Collected:  10/31/19 1754    Specimen:  Blood Updated:  10/31/19 1855     Potassium 4.4 mmol/L              Imaging:  Imaging Results (last 72 hours)     Procedure Component Value Units Date/Time    CT Chest Pulmonary Embolism [[id number]] Collected:  10/27/19 1855     Updated:  10/27/19 2100    Narrative:       EXAMINATION: CT ANGIOGRAM CHEST WITH IV CONTRAST       DATE OF EXAMINATION:  October 27, 2019    INDICATION: Shortness of breath    COMPARISON: None available.    PROCEDURE: 100 ml of Isovue 370 contrast were administered intravenously during arterial phase axial CT chest imaging, with 2-D sagittal and coronal and 3-D MIP reformations.  CT dose lowering techniques were used, to include: automated exposure control,   adjustment for  patient size, and or use of iterative reconstruction.    FINDINGS:    Cardiovascular: No pulmonary embolism is identified. Aorta and great vessels exhibit no aneurysm or dissection.    Mediastinum/Naima:  No mediastinal or hilar mass or significant lymphadenopathy identified.    Lungs/Pleura :  Moderate bibasilar consolidation with trace effusion.  Small infiltrates in the posterior portions of the upper lungs, as well.    Chest wall and Axilla: Normal.    Bones:  Unremarkable.    Upper abdomen: Vicarious excretion of contrast from the gallbladder.    Additional significant findings: None.        Impression:       1.  Moderate bilateral infiltrates and bibasilar areas of consolidation suggestive of pneumonia and/or atelectasis.    Electronically signed by:  Vadim Godinez M.D.    10/27/2019 6:59 PM    XR Chest 1 View [[id number]] Collected:  10/27/19 1925     Updated:  10/27/19 1929    Narrative:       DATE OF EXAM:  10/27/2019 5:45 PM     PROCEDURE:  XR CHEST 1 VW-     INDICATIONS:  soa; R10.84-Generalized abdominal pain; R50.9-Fever, unspecified;  D72.829-Elevated white blood cell count, unspecified; K65.1-Peritoneal  abscess; D64.9-Anemia, unspecified; R10.9-Unspecified abdominal pain     COMPARISON:  CT abdomen and pelvis 10/26/2019.     TECHNIQUE:   Single radiographic AP view of the chest was obtained.     FINDINGS:  Study limited by lordotic patient positioning. Overlying artifacts. Low  lung volumes with bibasilar opacities. No pneumothorax.  Cardiomediastinal contours within normal limits. Moderate air distention  of the stomach. No acute osseous abnormality is identified.        Impression:       Limited study demonstrating low lung volumes with bibasilar opacities  that likely represent atelectasis. Superimposed pneumonia not entirely  excluded.     Electronically Signed By-Claudy Cardoza On:10/27/2019 7:27 PM  This report was finalized on [id number] by  Claudy Cardoza, .    CT Abdomen Pelvis With Contrast  [[id number]] Collected:  10/26/19 0951     Updated:  10/26/19 0959    Narrative:       DATE OF EXAM:  10/26/2019 9:06 AM     PROCEDURE:  CT ABDOMEN PELVIS W CONTRAST-     INDICATIONS:   Abdominal pain, fever, leukocytosis.     COMPARISON:   04/17/2019     TECHNIQUE:  Routine transaxial slices were obtained through the abdomen and pelvis  after the intravenous administration of 100 mL of Isovue 370.  Reconstructed coronal and sagittal images were also obtained. Automated  exposure control and iterative construction methods were used.     FINDINGS:  There is abnormal thickening of the wall of the terminal ileum and  distal ileum in the right aspect of the pelvis consistent with a  nonspecific enteritis. This can be seen with inflammatory bowel disease  or infection. There is a fluid collection in the presacral space on  image #115 measuring 5.4 x 2.8 cm which may represent an organizing  abscess. There is no well-defined rim enhancement. There is a  rim-enhancing fluid collection adjacent to the right common iliac artery  on image #97 measuring 2.2 x 1.4 cm which is suspicious for an abscess.  There is streaky density is seen throughout the intrapelvic fat  consistent with inflammatory changes. The patient has cystic areas in  both adnexa. It is possible that the patient could have bilateral  tubo-ovarian abscesses and there may be reactive thickening of the wall  of the distal ileum. I would recommend clinical correlation.  Alternatively, these may represent simple hemorrhagic and follicular  ovarian cysts. The patient has a fibroid uterus. There is a partially  calcified fibroid extending off the left lateral aspect of the posterior  wall of the fundus of uterus measuring 3.1 x 2.5 cm. There are surgical  clips adjacent to the cecum indicating a previous appendectomy. There  are some borderline enlarged lymph nodes in the retroperitoneum felt to  be secondary to reactive hyperplasia. The urinary bladder  is  unremarkable. The liver, spleen, pancreas, adrenal glands, kidneys, and  gallbladder are normal. There is normal vascular enhancement. There is  mild bilateral basilar subsegmental atelectasis. There are no suspicious  osteolytic or sclerotic lesions in the bony structures.        Impression:          1. Abnormal thickening of the wall of the distal small bowel including  the terminal ileum. This can be seen with inflammatory bowel disease or  infectious enteritis.  2. There are cystic areas seen in both adnexa. I cannot completely  exclude bilateral tubo-ovarian abscesses. This could be causing reactive  thickening of the wall of the small bowel. I would recommend clinical  correlation.  3. There is suggestion of a small abscess in the upper pelvis adjacent  to the right common iliac artery. There appears to be an organizing  fluid collection in the presacral space suspicious for a developing  abscess.  4. Uterine fibroids.  5. Inflammatory changes are seen throughout the intrapelvic fat.  6. Additional findings as noted above.     Electronically Signed By-Denis Malhotra On:10/26/2019 9:57 AM  This report was finalized on [id number] by  Denis Malhotra, .            ASSESSMENT/PLAN:     Generalized abdominal pain    Acute hypoxic respiratory failure  Pulmonary edema and pleural effusion  Abdominal abscess s/p drainage and washout - culture with E. coli  H/o colitis  Tobacco abuse  Hypokalemia due to diuretics  Plan:       Now tolerating room air  Continue IS and add Flutter valve  Continue IV ABx. ID now following.  Currently on IV Rocephin  RVP neg  Urine antigen for Strep and Legionella neg  IS and Flutter    Encourage up to chair and ambulation when okay with surgery  Encouraged smoking cessation     Pulmonary status is stable.  We will see her next on Monday unless called over the weekend.

## 2019-11-01 NOTE — PROGRESS NOTES
[facility] Medicine Services Daily Progress Note      Hospitalist Team  LOS 5 days      Patient Care Team:  José Antonio Gaspar MD as PCP - General  José Antonio Gaspar MD as PCP - Family Medicine        Chief Complaint / Subjective  Chief Complaint   Patient presents with   • Abdominal Pain     abdominal pain with no bowel movement x 6 days       Brief Synopsis of Hospital Course/HPI        HPI  44-year-old female with history of IBS and depression presented with abdominal pain, diffuse without specific twisting relieving factors except worsening with movement so with nausea without vomiting or diarrhea.  She had fever in the ER but not recently.  Her symptoms started about 6 to 7 days ago.  She denies any bleeding anywhere.  She has history of STD in the remote past.  She reports history of colitis/inflammatory bowel disease but not any specific therapy.  She has history of Sjogren's syndrome based on labs not in any treatment.  Evaluation the ER showed evidence of pelvic abscess/possible tubo-ovarian abscess and other details as mentioned CT scan below.  Patient admitted for evaluation by surgeon Dr. Freire and start IV antibiotics IV fluids.  She had evidence of sepsis but no lactic acid elevation.  She has history of appendectomy and          Review of Systems   Constitution: Negative.   HENT: Negative.    Eyes: Negative.    Cardiovascular: Negative.    Respiratory: Negative.    Endocrine: Negative.    Hematologic/Lymphatic: Negative.    Skin: Negative.    Musculoskeletal: Negative.    Gastrointestinal: Negative.    Genitourinary: Negative.    Neurological: Negative.    Psychiatric/Behavioral: Negative.    Allergic/Immunologic: Negative.    All other systems reviewed and are negative.    10/27/2019  Given clear liquid diet by general surgery and he seems to be tolerating diet  Transfer to surgical floor planned  10/30- reports nausea and threw up last night and she thinks it was  "caused by one of the IV abx  10/31- nausea resolved with stopping flagyl    Past Medical History:   Diagnosis Date   • Colitis    • Irritable bowel syndrome    • Sjogren's syndrome (CMS/HCC)    • Substance abuse (CMS/HCC)     pills/ clean for 10 years       Social History     Socioeconomic History   • Marital status:      Spouse name: Not on file   • Number of children: Not on file   • Years of education: Not on file   • Highest education level: Not on file   Tobacco Use   • Smoking status: Current Every Day Smoker     Packs/day: 0.50     Years: 20.00     Pack years: 10.00     Types: Cigarettes   Substance and Sexual Activity   • Alcohol use: No     Frequency: Never   • Drug use: No   • Sexual activity: Defer           Objective: No BM yet,says she was told she would go home tomorrow      Vital Signs  Temp:  [98.3 °F (36.8 °C)-98.7 °F (37.1 °C)] 98.3 °F (36.8 °C)  Heart Rate:  [70-86] 80  Resp:  [14-16] 15  BP: (101-106)/(65-67) 104/67  Oxygen Therapy  SpO2: 96 %  Pulse Oximetry Type: Continuous  Device (Oxygen Therapy): room air  Flow (L/min): 0  Oxygen Concentration (%): 40  Flowsheet Rows      First Filed Value   Admission Height  152.4 cm (60\") Documented at 10/26/2019 0750   Admission Weight  61.2 kg (135 lb) Documented at 10/26/2019 0750        Intake & Output (last 3 days)       10/29 0701 - 10/30 0700 10/30 0701 - 10/31 0700 10/31 0701 - 11/01 0700 11/01 0701 - 11/02 0700    P.O. 960  840     I.V. (mL/kg)        IV Piggyback        Total Intake(mL/kg) 960 (14.9)  840 (12.9)     Urine (mL/kg/hr) 700 (0.5) 300 (0.2) 750 (0.5)     Emesis/NG output 500       Drains 25 60 10     Total Output 1225 360 760     Net -265 -360 +80             Urine Unmeasured Occurrence 10 x  4 x         Lines, Drains & Airways    Active LDAs     Name:   Placement date:   Placement time:   Site:   Days:    Peripheral IV 10/26/19 0823 Left Antecubital   10/26/19    0823    Antecubital   1    Closed/Suction Drain 1 Midline " Abdomen Bulb 19 Fr.   10/26/19    1849    Abdomen   less than 1    Urethral Catheter Non-latex   10/26/19    1800     less than 1    ETT    10/26/19    1801 created via procedure documentation     less than 1                  Physical Exam:    Physical Exam   Constitutional: She is oriented to person, place, and time. She appears well-developed and well-nourished. No distress.   HENT:   Head: Normocephalic and atraumatic.   Right Ear: External ear normal.   Left Ear: External ear normal.   Nose: Nose normal.   Mouth/Throat: Oropharynx is clear and moist. No oropharyngeal exudate.   Eyes: Conjunctivae and EOM are normal. Pupils are equal, round, and reactive to light. Right eye exhibits no discharge. Left eye exhibits no discharge. No scleral icterus.   Neck: Normal range of motion. No JVD present. No tracheal deviation present. No thyromegaly present.   Cardiovascular: Normal rate, regular rhythm, normal heart sounds and intact distal pulses. Exam reveals no gallop and no friction rub.   No murmur heard.  Pulmonary/Chest: Effort normal and breath sounds normal. No stridor. No respiratory distress. She has no wheezes. She has no rales. She exhibits no tenderness.   Abdominal: Soft. Bowel sounds are normal. She exhibits no distension and no mass. There is no tenderness. There is no rebound and no guarding. No hernia.   Musculoskeletal: Normal range of motion. She exhibits no edema, tenderness or deformity.   Lymphadenopathy:     She has no cervical adenopathy.   Neurological: She is alert and oriented to person, place, and time. No cranial nerve deficit or sensory deficit. She exhibits normal muscle tone. Coordination normal.   Skin: Skin is warm and dry. No rash noted. She is not diaphoretic. No erythema.   Psychiatric: She has a normal mood and affect. Her behavior is normal.   Nursing note and vitals reviewed.        Procedures:    Procedure(s):  LAPAROTOMY EXPLORATORY  WITH RIGHT SALPINGECTOMY          Results  Review:     I reviewed the patient's new clinical results.      Results from last 7 days   Lab Units 11/01/19  0401 10/31/19  0256 10/30/19  0320   WBC 10*3/mm3 8.50 12.10* 13.60*   HEMOGLOBIN g/dL 9.8* 9.3* 9.2*   HEMATOCRIT % 29.8* 26.9* 27.9*   PLATELETS 10*3/mm3 617* 585* 556*     Results from last 7 days   Lab Units 11/01/19  0401 10/31/19  1754 10/31/19  0257 10/30/19  0320   SODIUM mmol/L 137  --  138 139   POTASSIUM mmol/L 4.2 4.4 3.2* 3.2*   CHLORIDE mmol/L 101  --  99 98   CO2 mmol/L 27.0  --  27.0 25.0   BUN mg/dL 12  --  15 9   CREATININE mg/dL 0.61  --  0.71 0.67   CALCIUM mg/dL 9.1  --  9.0 9.2   BILIRUBIN mg/dL 0.2  --  0.2 0.2   ALK PHOS U/L 126*  --  119* 132*   ALT (SGPT) U/L 19  --  15 19   AST (SGOT) U/L 27  --  20 20   GLUCOSE mg/dL 89  --  126* 96         Lab Results   Component Value Date    CALCIUM 9.1 11/01/2019     No results found for: HGBA1C        Results from last 7 days   Lab Units 10/27/19  1857   PH, ARTERIAL pH units 7.369   PO2 ART mm Hg 57.1*   PCO2, ARTERIAL mm Hg 29.0*   HCO3 ART mmol/L 16.7*         Microbiology Results (last 10 days)     Procedure Component Value - Date/Time    Respiratory Panel, PCR - Swab, Nasopharynx [[id number]]  (Normal) Collected:  10/27/19 7513    Lab Status:  Final result Specimen:  Swab from Nasopharynx Updated:  10/27/19 2559     ADENOVIRUS, PCR Not Detected     Coronavirus 229E Not Detected     Coronavirus HKU1 Not Detected     Coronavirus NL63 Not Detected     Coronavirus OC43 Not Detected     Human Metapneumovirus Not Detected     Human Rhinovirus/Enterovirus Not Detected     Influenza B PCR Not Detected     Parainfluenza Virus 1 Not Detected     Parainfluenza Virus 2 Not Detected     Parainfluenza Virus 3 Not Detected     Parainfluenza Virus 4 Not Detected     Bordetella pertussis pcr Not Detected     Influenza A H1 2009 PCR Not Detected     Chlamydophila pneumoniae PCR Not Detected     Mycoplasma pneumo by PCR Not Detected     Influenza A PCR Not  Detected     Influenza A H3 Not Detected     Influenza A H1 Not Detected     RSV, PCR Not Detected    S. Pneumo Ag Urine or CSF - Urine, Urine, Clean Catch [[id number]]  (Normal) Collected:  10/27/19 2217    Lab Status:  Final result Specimen:  Urine, Clean Catch Updated:  10/27/19 2309     Strep Pneumo Ag Negative    Legionella Antigen, Urine - Urine, Urine, Clean Catch [[id number]]  (Normal) Collected:  10/27/19 2217    Lab Status:  Final result Specimen:  Urine, Clean Catch Updated:  10/27/19 2309     LEGIONELLA ANTIGEN, URINE Negative    Chlamydia trachomatis, Neisseria gonorrhoeae, PCR - Urine, Urine, Clean Catch [[id number]]  (Normal) Collected:  10/27/19 2217    Lab Status:  Final result Specimen:  Urine, Clean Catch Updated:  10/28/19 0041     Chlamydia DNA by PCR Not Detected     Neisseria gonorrhoeae by PCR Not Detected    Anaerobic Culture - Peritoneal Fluid, Peritoneum [[id number]] Collected:  10/26/19 1825    Lab Status:  Final result Specimen:  Peritoneal Fluid from Peritoneum Updated:  10/31/19 1017     Anaerobic Culture No anaerobes isolated at 5 days    Body Fluid Culture - Peritoneal Fluid, Peritoneum [[id number]]  (Abnormal)  (Susceptibility) Collected:  10/26/19 1825    Lab Status:  Final result Specimen:  Peritoneal Fluid from Peritoneum Updated:  10/28/19 0850     Body Fluid Culture Scant growth (1+) Escherichia coli     Gram Stain Many (4+) WBCs per low power field      No organisms seen    Susceptibility      Escherichia coli     JANNY     Ampicillin Susceptible     Ampicillin + Sulbactam Susceptible     Cefazolin Susceptible     Cefepime Susceptible     Ceftazidime Susceptible     Ceftriaxone Susceptible     Gentamicin Susceptible     Levofloxacin Resistant     Piperacillin + Tazobactam Susceptible     Trimethoprim + Sulfamethoxazole Resistant                    Blood Culture - Blood, Arm, Right [[id number]] Collected:  10/26/19 0836    Lab Status:  Final result Specimen:  Blood from Arm, Right  Updated:  10/31/19 0900     Blood Culture No growth at 5 days    Blood Culture - Blood, Arm, Left [[id number]] Collected:  10/26/19 0809    Lab Status:  Final result Specimen:  Blood from Arm, Left Updated:  10/31/19 0830     Blood Culture No growth at 5 days          ECG/EMG Results (most recent)     Procedure Component Value Units Date/Time    Adult Transthoracic Echo Complete W/ Cont if Necessary Per Protocol [[id number]] Collected:  10/28/19 1058     Updated:  10/28/19 1731     BSA 1.6 m^2      IVSd 0.71 cm      LVIDd 4.6 cm      LVIDs 3.3 cm      LVPWd 0.85 cm      IVS/LVPW 0.83     FS 28.4 %      EDV(Teich) 98.4 ml      ESV(Teich) 44.5 ml      EF(Teich) 54.8 %      EDV(cubed) 98.7 ml      ESV(cubed) 36.3 ml      EF(cubed) 63.3 %      LV mass(C)d 115.7 grams      LV mass(C)dI 71.7 grams/m^2      SV(Teich) 53.9 ml      SI(Teich) 33.4 ml/m^2      SV(cubed) 62.4 ml      SI(cubed) 38.7 ml/m^2      Ao root diam 3.0 cm      Ao root area 7.2 cm^2      ACS 1.9 cm      LVOT diam 1.7 cm      LVOT area 2.3 cm^2      EDV(MOD-sp4) 98.8 ml      ESV(MOD-sp4) 36.0 ml      EF(MOD-sp4) 63.6 %      SV(MOD-sp4) 62.8 ml      SI(MOD-sp4) 38.9 ml/m^2      Ao root area (BSA corrected) 1.9     LV Hagan Vol (BSA corrected) 61.2 ml/m^2      LV Sys Vol (BSA corrected) 22.3 ml/m^2      Aortic R-R 0.66 sec      Aortic HR 90.6 BPM      MV E max josefina 95.0 cm/sec      MV A max josefina 78.5 cm/sec      MV E/A 1.2     MV V2 max 118.4 cm/sec      MV max PG 5.6 mmHg      MV V2 mean 83.7 cm/sec      MV mean PG 3.1 mmHg      MV V2 VTI 19.4 cm      MVA(VTI) 3.3 cm^2      MV dec slope 617.6 cm/sec^2      MV dec time 0.15 sec      Ao pk josefina 176.6 cm/sec      Ao max PG 12.5 mmHg      Ao max PG (full) 5.5 mmHg      Ao V2 mean 125.8 cm/sec      Ao mean PG 7.0 mmHg      Ao mean PG (full) 3.8 mmHg      Ao V2 VTI 36.4 cm      JAEL(I,A) 1.7 cm^2      JAEL(I,D) 1.7 cm^2      JAEL(V,A) 1.7 cm^2      JAEL(V,D) 1.7 cm^2      LV V1 max PG 7.0 mmHg      LV V1 mean PG 3.2 mmHg       LV V1 max 131.8 cm/sec      LV V1 mean 81.7 cm/sec      LV V1 VTI 28.0 cm      CO(Ao) 23.8 l/min      CI(Ao) 14.7 l/min/m^2      SV(Ao) 262.2 ml      SI(Ao) 162.4 ml/m^2      CO(LVOT) 5.7 l/min      CI(LVOT) 3.6 l/min/m^2      SV(LVOT) 63.3 ml      SI(LVOT) 39.3 ml/m^2      PA V2 max 130.8 cm/sec      PA max PG 6.9 mmHg      PA max PG (full) 5.2 mmHg      PA acc time 0.15 sec      PI end-d chris 170.3 cm/sec      PI max chris 246.5 cm/sec      PI max PG 24.3 mmHg      RV V1 max PG 1.7 mmHg      RV V1 mean PG 1.0 mmHg      RV V1 max 65.2 cm/sec      RV V1 mean 48.2 cm/sec      RV V1 VTI 13.9 cm      TR max chris 267.3 cm/sec      RVSP(TR) 31.6 mmHg      RAP systole 3.0 mmHg      PA pr(Accel) 12.2 mmHg      Pulm Sys Chris 73.3 cm/sec      Pulm Hagan Chris 61.5 cm/sec      Pulm S/D 1.2      CV ECHO [place] - BZI_BMI 27.7 kilograms/m^2       CV ECHO [place] - BSA(HAYCOCK) 1.7 m^2       CV ECHO [place] - BZI_METRIC_WEIGHT 64.4 kg       CV ECHO [place] - BZI_METRIC_HEIGHT 152.4 cm      EF(MOD-bp) 64.0 %      LA dimension(2D) 3.2 cm      Echo EF Estimated 60 %     Narrative:         · Estimated EF = 60%.  · Left ventricular systolic function is normal.     Indications  Dyspnea    Technically satisfactory study.  Mitral valve is structurally normal.  Tricuspid valve is structurally normal.  Aortic valve is structurally normal.  Pulmonic valve could not be well visualized.  No evidence for mitral tricuspid or aortic regurgitation is seen by   Doppler study.  Left atrium is normal in size.  Right atrium is normal in size.  Left ventricle is normal in size and contractility with ejection fraction   of 60%.  Right ventricle is normal in size.  Atrial septum is intact.  Aorta is normal.  No pericardial effusion or intracardiac thrombus is seen.    Impression  Structurally and functionally normal cardiac valves.  Normal left ventricle size and contractility with ejection fraction of   60%.  Normal echo Doppler study.  Left ventricular  ejection fraction is 60%.              Results for orders placed during the hospital encounter of 10/26/19   Duplex Venous Lower Extremity - Bilateral CAR    Narrative · Normal bilateral lower extremity venous duplex scan.          Results for orders placed during the hospital encounter of 10/26/19   Adult Transthoracic Echo Complete W/ Cont if Necessary Per Protocol    Narrative · Estimated EF = 60%.  · Left ventricular systolic function is normal.     Indications  Dyspnea    Technically satisfactory study.  Mitral valve is structurally normal.  Tricuspid valve is structurally normal.  Aortic valve is structurally normal.  Pulmonic valve could not be well visualized.  No evidence for mitral tricuspid or aortic regurgitation is seen by   Doppler study.  Left atrium is normal in size.  Right atrium is normal in size.  Left ventricle is normal in size and contractility with ejection fraction   of 60%.  Right ventricle is normal in size.  Atrial septum is intact.  Aorta is normal.  No pericardial effusion or intracardiac thrombus is seen.    Impression  Structurally and functionally normal cardiac valves.  Normal left ventricle size and contractility with ejection fraction of   60%.  Normal echo Doppler study.  Left ventricular ejection fraction is 60%.           Ct Abdomen Pelvis With Contrast    Result Date: 10/26/2019   1. Abnormal thickening of the wall of the distal small bowel including the terminal ileum. This can be seen with inflammatory bowel disease or infectious enteritis. 2. There are cystic areas seen in both adnexa. I cannot completely exclude bilateral tubo-ovarian abscesses. This could be causing reactive thickening of the wall of the small bowel. I would recommend clinical correlation. 3. There is suggestion of a small abscess in the upper pelvis adjacent to the right common iliac artery. There appears to be an organizing fluid collection in the presacral space suspicious for a developing abscess. 4.  Uterine fibroids. 5. Inflammatory changes are seen throughout the intrapelvic fat. 6. Additional findings as noted above.  Electronically Signed By-Denis Malhotra On:10/26/2019 9:57 AM This report was finalized on [id number] by  Denis Malhotra, .    Xr Chest 1 View    Result Date: 10/27/2019  Limited study demonstrating low lung volumes with bibasilar opacities that likely represent atelectasis. Superimposed pneumonia not entirely excluded.  Electronically Signed By-Claudy Cardoza On:10/27/2019 7:27 PM This report was finalized on [id number] by  Claudy Cardoza, .    Ct Chest Pulmonary Embolism    Result Date: 10/27/2019  1.  Moderate bilateral infiltrates and bibasilar areas of consolidation suggestive of pneumonia and/or atelectasis. Electronically signed by:  Vadim Godinez M.D.  10/27/2019 6:59 PM      Xrays, labs reviewed personally by physician.    Medication Review:   I have reviewed the patient's current medication list      Scheduled Meds    ceftriaxone 2 g Intravenous Q24H   enoxaparin 40 mg Subcutaneous Daily   famotidine 20 mg Intravenous Q12H   PARoxetine 40 mg Oral Daily   sodium chloride 10 mL Intravenous Q12H   sodium chloride 3 mL Intravenous Q12H       Meds Infusions       Meds PRN  •  acetaminophen **OR** acetaminophen **OR** acetaminophen  •  bisacodyl  •  docusate sodium  •  HYDROcodone-acetaminophen  •  HYDROcodone-acetaminophen  •  HYDROmorphone **AND** naloxone  •  ibuprofen  •  ipratropium-albuterol  •  magnesium hydroxide  •  magnesium sulfate **OR** magnesium sulfate **OR** magnesium sulfate  •  melatonin  •  [DISCONTINUED] Morphine **AND** naloxone  •  ondansetron **OR** ondansetron  •  potassium & sodium phosphates **OR** potassium & sodium phosphates  •  potassium chloride  •  potassium chloride  •  promethazine **OR** promethazine  •  sodium chloride  •  sodium chloride    Assessment / Plan    Active Hospital Problems    Diagnosis  POA   • Generalized abdominal pain [R10.84]  Yes       Resolved Hospital Problems   No resolved problems to display.       Acute abdominal pain secondary to pelvic abscess with abnormal CT scan suggestive of pelvic abscess with the following details:  1. Abnormal thickening of the wall of the distal small bowel including  the terminal ileum. This can be seen with inflammatory bowel disease or  infectious enteritis.  2. There are cystic areas seen in both adnexa. I cannot completely  exclude bilateral tubo-ovarian abscesses. This could be causing reactive  thickening of the wall of the small bowel. I would recommend clinical  correlation.  3. There is suggestion of a small abscess in the upper pelvis adjacent  to the right common iliac artery. There appears to be an organizing  fluid collection in the presacral space suspicious for a developing  abscess.  4. Uterine fibroids.  -Continue Zosyn/doxycycline  - 10/26/2019 status post exploratory laparotomy with washout of intra-abdominal abscess and right salpingectomy.  Status post intraoperative gynecologic consultation with Dr. Griffith.  -Follow-up on pathology and culture results  -Negative hCG  -GC chlamydia antigen pending    -Leukocytosis/normocytic anemia likely related to acute illness.  Monitor for GI bleed    Sepsis on admission secondary intra-abdominal infection with leukocytosis and fever but normal lactic acid    History of depression, Sjogren's syndrome    Hypoxemia- resolved,now on room air        DVT prophylaxis SCD  GI prophylaxis Pepcid.    History of substance abuse on records but patient denies any.  Tobacco dependence  Consider follow-up with GI eventually given CT scan abnormalities distal small bowel    PT Recommendation and Plan  Likely home whenever stable       Discharge Planning    Destination      No service coordination in this encounter.      Durable Medical Equipment      No service coordination in this encounter.      Dialysis/Infusion      No service coordination in this encounter.       Home Medical Care      No service coordination in this encounter.      Therapy      No service coordination in this encounter.      Community Resources      No service coordination in this encounter.          Jhonatan Jung Jr., MD  11/01/19  9:38 AM

## 2019-11-02 LAB
ALBUMIN SERPL-MCNC: 3.3 G/DL (ref 3.5–5.2)
ALBUMIN/GLOB SERPL: 0.8 G/DL
ALP SERPL-CCNC: 155 U/L (ref 39–117)
ALT SERPL W P-5'-P-CCNC: 34 U/L (ref 1–33)
ANION GAP SERPL CALCULATED.3IONS-SCNC: 12 MMOL/L (ref 5–15)
AST SERPL-CCNC: 54 U/L (ref 1–32)
BASOPHILS # BLD AUTO: 0 10*3/MM3 (ref 0–0.2)
BASOPHILS NFR BLD AUTO: 0.2 % (ref 0–1.5)
BILIRUB SERPL-MCNC: 0.2 MG/DL (ref 0.2–1.2)
BUN BLD-MCNC: 9 MG/DL (ref 6–20)
BUN/CREAT SERPL: 15.8 (ref 7–25)
CALCIUM SPEC-SCNC: 9.2 MG/DL (ref 8.6–10.5)
CHLORIDE SERPL-SCNC: 101 MMOL/L (ref 98–107)
CO2 SERPL-SCNC: 24 MMOL/L (ref 22–29)
CREAT BLD-MCNC: 0.57 MG/DL (ref 0.57–1)
DEPRECATED RDW RBC AUTO: 46.8 FL (ref 37–54)
EOSINOPHIL # BLD AUTO: 0.4 10*3/MM3 (ref 0–0.4)
EOSINOPHIL NFR BLD AUTO: 4.8 % (ref 0.3–6.2)
ERYTHROCYTE [DISTWIDTH] IN BLOOD BY AUTOMATED COUNT: 15.7 % (ref 12.3–15.4)
GFR SERPL CREATININE-BSD FRML MDRD: 115 ML/MIN/1.73
GLOBULIN UR ELPH-MCNC: 4 GM/DL
GLUCOSE BLD-MCNC: 100 MG/DL (ref 65–99)
HCT VFR BLD AUTO: 29.6 % (ref 34–46.6)
HGB BLD-MCNC: 9.9 G/DL (ref 12–15.9)
LYMPHOCYTES # BLD AUTO: 1.7 10*3/MM3 (ref 0.7–3.1)
LYMPHOCYTES NFR BLD AUTO: 23.3 % (ref 19.6–45.3)
MCH RBC QN AUTO: 28.4 PG (ref 26.6–33)
MCHC RBC AUTO-ENTMCNC: 33.6 G/DL (ref 31.5–35.7)
MCV RBC AUTO: 84.5 FL (ref 79–97)
MONOCYTES # BLD AUTO: 0.7 10*3/MM3 (ref 0.1–0.9)
MONOCYTES NFR BLD AUTO: 9 % (ref 5–12)
NEUTROPHILS # BLD AUTO: 4.6 10*3/MM3 (ref 1.7–7)
NEUTROPHILS NFR BLD AUTO: 62.7 % (ref 42.7–76)
NRBC BLD AUTO-RTO: 0.1 /100 WBC (ref 0–0.2)
PLATELET # BLD AUTO: 626 10*3/MM3 (ref 140–450)
PMV BLD AUTO: 7.6 FL (ref 6–12)
POTASSIUM BLD-SCNC: 4.1 MMOL/L (ref 3.5–5.2)
PROT SERPL-MCNC: 7.3 G/DL (ref 6–8.5)
RBC # BLD AUTO: 3.5 10*6/MM3 (ref 3.77–5.28)
SODIUM BLD-SCNC: 137 MMOL/L (ref 136–145)
WBC NRBC COR # BLD: 7.3 10*3/MM3 (ref 3.4–10.8)

## 2019-11-02 PROCEDURE — 25010000002 ENOXAPARIN PER 10 MG: Performed by: SURGERY

## 2019-11-02 PROCEDURE — 99024 POSTOP FOLLOW-UP VISIT: CPT | Performed by: SURGERY

## 2019-11-02 PROCEDURE — 85025 COMPLETE CBC W/AUTO DIFF WBC: CPT | Performed by: INTERNAL MEDICINE

## 2019-11-02 PROCEDURE — 80053 COMPREHEN METABOLIC PANEL: CPT | Performed by: INTERNAL MEDICINE

## 2019-11-02 PROCEDURE — 99232 SBSQ HOSP IP/OBS MODERATE 35: CPT | Performed by: INTERNAL MEDICINE

## 2019-11-02 PROCEDURE — 25010000002 CEFTRIAXONE PER 250 MG: Performed by: NURSE PRACTITIONER

## 2019-11-02 RX ORDER — BISACODYL 10 MG
10 SUPPOSITORY, RECTAL RECTAL DAILY PRN
Status: DISCONTINUED | OUTPATIENT
Start: 2019-11-02 | End: 2019-11-03 | Stop reason: HOSPADM

## 2019-11-02 RX ADMIN — HYDROCODONE BITARTRATE AND ACETAMINOPHEN 1 TABLET: 10; 325 TABLET ORAL at 09:54

## 2019-11-02 RX ADMIN — PAROXETINE HYDROCHLORIDE 40 MG: 20 TABLET, FILM COATED ORAL at 09:54

## 2019-11-02 RX ADMIN — Medication 10 ML: at 09:00

## 2019-11-02 RX ADMIN — Medication 3 ML: at 09:54

## 2019-11-02 RX ADMIN — HYDROCODONE BITARTRATE AND ACETAMINOPHEN 1 TABLET: 10; 325 TABLET ORAL at 16:00

## 2019-11-02 RX ADMIN — CEFTRIAXONE SODIUM 2 G: 2 INJECTION, POWDER, FOR SOLUTION INTRAMUSCULAR; INTRAVENOUS at 21:51

## 2019-11-02 RX ADMIN — FAMOTIDINE 20 MG: 10 INJECTION, SOLUTION INTRAVENOUS at 21:51

## 2019-11-02 RX ADMIN — POLYETHYLENE GLYCOL 3350 17 G: 17 POWDER, FOR SOLUTION ORAL at 09:55

## 2019-11-02 RX ADMIN — ENOXAPARIN SODIUM 40 MG: 40 INJECTION SUBCUTANEOUS at 16:00

## 2019-11-02 RX ADMIN — FAMOTIDINE 20 MG: 10 INJECTION, SOLUTION INTRAVENOUS at 09:54

## 2019-11-02 RX ADMIN — BISACODYL 10 MG: 10 SUPPOSITORY RECTAL at 18:19

## 2019-11-02 RX ADMIN — HYDROCODONE BITARTRATE AND ACETAMINOPHEN 1 TABLET: 10; 325 TABLET ORAL at 21:51

## 2019-11-02 NOTE — PROGRESS NOTES
[facility] Medicine Services Daily Progress Note      Hospitalist Team  LOS 6 days      Patient Care Team:  José Antonio Gaspar MD as PCP - General  José Antonio Gaspar MD as PCP - Family Medicine        Chief Complaint / Subjective  Chief Complaint   Patient presents with   • Abdominal Pain     abdominal pain with no bowel movement x 6 days       Brief Synopsis of Hospital Course/HPI        HPI  44-year-old female with history of IBS and depression presented with abdominal pain, diffuse without specific twisting relieving factors except worsening with movement so with nausea without vomiting or diarrhea.  She had fever in the ER but not recently.  Her symptoms started about 6 to 7 days ago.  She denies any bleeding anywhere.  She has history of STD in the remote past.  She reports history of colitis/inflammatory bowel disease but not any specific therapy.  She has history of Sjogren's syndrome based on labs not in any treatment.  Evaluation the ER showed evidence of pelvic abscess/possible tubo-ovarian abscess and other details as mentioned CT scan below.  Patient admitted for evaluation by surgeon Dr. Freire and start IV antibiotics IV fluids.  She had evidence of sepsis but no lactic acid elevation.  She has history of appendectomy and          Review of Systems   Constitution: Negative.   HENT: Negative.    Eyes: Negative.    Cardiovascular: Negative.    Respiratory: Negative.    Endocrine: Negative.    Hematologic/Lymphatic: Negative.    Skin: Negative.    Musculoskeletal: Negative.    Gastrointestinal: Negative.    Genitourinary: Negative.    Neurological: Negative.    Psychiatric/Behavioral: Negative.    Allergic/Immunologic: Negative.    All other systems reviewed and are negative.    10/27/2019  Given clear liquid diet by general surgery and he seems to be tolerating diet  Transfer to surgical floor planned  10/30- reports nausea and threw up last night and she thinks it was  "caused by one of the IV abx  10/31- nausea resolved with stopping flagyl    Past Medical History:   Diagnosis Date   • Colitis    • Irritable bowel syndrome    • Sjogren's syndrome (CMS/HCC)    • Substance abuse (CMS/HCC)     pills/ clean for 10 years       Social History     Socioeconomic History   • Marital status:      Spouse name: Not on file   • Number of children: Not on file   • Years of education: Not on file   • Highest education level: Not on file   Tobacco Use   • Smoking status: Current Every Day Smoker     Packs/day: 0.50     Years: 20.00     Pack years: 10.00     Types: Cigarettes   Substance and Sexual Activity   • Alcohol use: No     Frequency: Never   • Drug use: No   • Sexual activity: Defer           Objective: No BM yet      Vital Signs  Temp:  [98.6 °F (37 °C)-99.4 °F (37.4 °C)] 98.7 °F (37.1 °C)  Heart Rate:  [73-90] 73  Resp:  [11-17] 11  BP: ()/(59-66) 97/61  Oxygen Therapy  SpO2: 95 %  Pulse Oximetry Type: Continuous  Device (Oxygen Therapy): room air  Flow (L/min): 0  Oxygen Concentration (%): 40  Flowsheet Rows      First Filed Value   Admission Height  152.4 cm (60\") Documented at 10/26/2019 0750   Admission Weight  61.2 kg (135 lb) Documented at 10/26/2019 0750        Intake & Output (last 3 days)       10/30 0701 - 10/31 0700 10/31 0701 - 11/01 0700 11/01 0701 - 11/02 0700 11/02 0701 - 11/03 0700    P.O.  840 720     I.V. (mL/kg)   110 (1.7)     Total Intake(mL/kg)  840 (12.9) 830 (13)     Urine (mL/kg/hr) 300 (0.2) 750 (0.5) 900 (0.6)     Emesis/NG output        Drains 60 10      Total Output 360 760 900     Net -360 +80 -70             Urine Unmeasured Occurrence  4 x          Lines, Drains & Airways    Active LDAs     Name:   Placement date:   Placement time:   Site:   Days:    Peripheral IV 10/26/19 0823 Left Antecubital   10/26/19    0823    Antecubital   1    Closed/Suction Drain 1 Midline Abdomen Bulb 19 Fr.   10/26/19    4105    Abdomen   less than 1    Urethral " Catheter Non-latex   10/26/19    1800     less than 1    ETT    10/26/19    1801 created via procedure documentation     less than 1                  Physical Exam:    Physical Exam   Constitutional: She is oriented to person, place, and time. She appears well-developed and well-nourished. No distress.   HENT:   Head: Normocephalic and atraumatic.   Right Ear: External ear normal.   Left Ear: External ear normal.   Nose: Nose normal.   Mouth/Throat: Oropharynx is clear and moist. No oropharyngeal exudate.   Eyes: Conjunctivae and EOM are normal. Pupils are equal, round, and reactive to light. Right eye exhibits no discharge. Left eye exhibits no discharge. No scleral icterus.   Neck: Normal range of motion. No JVD present. No tracheal deviation present. No thyromegaly present.   Cardiovascular: Normal rate, regular rhythm, normal heart sounds and intact distal pulses. Exam reveals no gallop and no friction rub.   No murmur heard.  Pulmonary/Chest: Effort normal and breath sounds normal. No stridor. No respiratory distress. She has no wheezes. She has no rales. She exhibits no tenderness.   Abdominal: Soft. Bowel sounds are normal. She exhibits no distension and no mass. There is no tenderness. There is no rebound and no guarding. No hernia.   Musculoskeletal: Normal range of motion. She exhibits no edema, tenderness or deformity.   Lymphadenopathy:     She has no cervical adenopathy.   Neurological: She is alert and oriented to person, place, and time. No cranial nerve deficit or sensory deficit. She exhibits normal muscle tone. Coordination normal.   Skin: Skin is warm and dry. No rash noted. She is not diaphoretic. No erythema.   Psychiatric: She has a normal mood and affect. Her behavior is normal.   Nursing note and vitals reviewed.        Procedures:    Procedure(s):  LAPAROTOMY EXPLORATORY  WITH RIGHT SALPINGECTOMY          Results Review:     I reviewed the patient's new clinical results.      Results from  last 7 days   Lab Units 11/02/19 0417 11/01/19  0401 10/31/19  0256   WBC 10*3/mm3 7.30 8.50 12.10*   HEMOGLOBIN g/dL 9.9* 9.8* 9.3*   HEMATOCRIT % 29.6* 29.8* 26.9*   PLATELETS 10*3/mm3 626* 617* 585*     Results from last 7 days   Lab Units 11/02/19 0417 11/01/19  0401 10/31/19  1754 10/31/19  0257   SODIUM mmol/L 137 137  --  138   POTASSIUM mmol/L 4.1 4.2 4.4 3.2*   CHLORIDE mmol/L 101 101  --  99   CO2 mmol/L 24.0 27.0  --  27.0   BUN mg/dL 9 12  --  15   CREATININE mg/dL 0.57 0.61  --  0.71   CALCIUM mg/dL 9.2 9.1  --  9.0   BILIRUBIN mg/dL 0.2 0.2  --  0.2   ALK PHOS U/L 155* 126*  --  119*   ALT (SGPT) U/L 34* 19  --  15   AST (SGOT) U/L 54* 27  --  20   GLUCOSE mg/dL 100* 89  --  126*         Lab Results   Component Value Date    CALCIUM 9.2 11/02/2019     No results found for: HGBA1C        Results from last 7 days   Lab Units 10/27/19  1857   PH, ARTERIAL pH units 7.369   PO2 ART mm Hg 57.1*   PCO2, ARTERIAL mm Hg 29.0*   HCO3 ART mmol/L 16.7*         Microbiology Results (last 10 days)     Procedure Component Value - Date/Time    Respiratory Panel, PCR - Swab, Nasopharynx [[id number]]  (Normal) Collected:  10/27/19 2218    Lab Status:  Final result Specimen:  Swab from Nasopharynx Updated:  10/27/19 9590     ADENOVIRUS, PCR Not Detected     Coronavirus 229E Not Detected     Coronavirus HKU1 Not Detected     Coronavirus NL63 Not Detected     Coronavirus OC43 Not Detected     Human Metapneumovirus Not Detected     Human Rhinovirus/Enterovirus Not Detected     Influenza B PCR Not Detected     Parainfluenza Virus 1 Not Detected     Parainfluenza Virus 2 Not Detected     Parainfluenza Virus 3 Not Detected     Parainfluenza Virus 4 Not Detected     Bordetella pertussis pcr Not Detected     Influenza A H1 2009 PCR Not Detected     Chlamydophila pneumoniae PCR Not Detected     Mycoplasma pneumo by PCR Not Detected     Influenza A PCR Not Detected     Influenza A H3 Not Detected     Influenza A H1 Not Detected      RSV, PCR Not Detected    S. Pneumo Ag Urine or CSF - Urine, Urine, Clean Catch [[id number]]  (Normal) Collected:  10/27/19 2217    Lab Status:  Final result Specimen:  Urine, Clean Catch Updated:  10/27/19 2309     Strep Pneumo Ag Negative    Legionella Antigen, Urine - Urine, Urine, Clean Catch [[id number]]  (Normal) Collected:  10/27/19 2217    Lab Status:  Final result Specimen:  Urine, Clean Catch Updated:  10/27/19 2309     LEGIONELLA ANTIGEN, URINE Negative    Chlamydia trachomatis, Neisseria gonorrhoeae, PCR - Urine, Urine, Clean Catch [[id number]]  (Normal) Collected:  10/27/19 2217    Lab Status:  Final result Specimen:  Urine, Clean Catch Updated:  10/28/19 0041     Chlamydia DNA by PCR Not Detected     Neisseria gonorrhoeae by PCR Not Detected    Anaerobic Culture - Peritoneal Fluid, Peritoneum [[id number]] Collected:  10/26/19 1825    Lab Status:  Final result Specimen:  Peritoneal Fluid from Peritoneum Updated:  10/31/19 1017     Anaerobic Culture No anaerobes isolated at 5 days    Body Fluid Culture - Peritoneal Fluid, Peritoneum [[id number]]  (Abnormal)  (Susceptibility) Collected:  10/26/19 1825    Lab Status:  Final result Specimen:  Peritoneal Fluid from Peritoneum Updated:  10/28/19 0850     Body Fluid Culture Scant growth (1+) Escherichia coli     Gram Stain Many (4+) WBCs per low power field      No organisms seen    Susceptibility      Escherichia coli     JANNY     Ampicillin Susceptible     Ampicillin + Sulbactam Susceptible     Cefazolin Susceptible     Cefepime Susceptible     Ceftazidime Susceptible     Ceftriaxone Susceptible     Gentamicin Susceptible     Levofloxacin Resistant     Piperacillin + Tazobactam Susceptible     Trimethoprim + Sulfamethoxazole Resistant                    Blood Culture - Blood, Arm, Right [[id number]] Collected:  10/26/19 0836    Lab Status:  Final result Specimen:  Blood from Arm, Right Updated:  10/31/19 0900     Blood Culture No growth at 5 days    Blood  Culture - Blood, Arm, Left [[id number]] Collected:  10/26/19 0809    Lab Status:  Final result Specimen:  Blood from Arm, Left Updated:  10/31/19 0830     Blood Culture No growth at 5 days          ECG/EMG Results (most recent)     Procedure Component Value Units Date/Time    Adult Transthoracic Echo Complete W/ Cont if Necessary Per Protocol [[id number]] Collected:  10/28/19 1058     Updated:  10/28/19 1731     BSA 1.6 m^2      IVSd 0.71 cm      LVIDd 4.6 cm      LVIDs 3.3 cm      LVPWd 0.85 cm      IVS/LVPW 0.83     FS 28.4 %      EDV(Teich) 98.4 ml      ESV(Teich) 44.5 ml      EF(Teich) 54.8 %      EDV(cubed) 98.7 ml      ESV(cubed) 36.3 ml      EF(cubed) 63.3 %      LV mass(C)d 115.7 grams      LV mass(C)dI 71.7 grams/m^2      SV(Teich) 53.9 ml      SI(Teich) 33.4 ml/m^2      SV(cubed) 62.4 ml      SI(cubed) 38.7 ml/m^2      Ao root diam 3.0 cm      Ao root area 7.2 cm^2      ACS 1.9 cm      LVOT diam 1.7 cm      LVOT area 2.3 cm^2      EDV(MOD-sp4) 98.8 ml      ESV(MOD-sp4) 36.0 ml      EF(MOD-sp4) 63.6 %      SV(MOD-sp4) 62.8 ml      SI(MOD-sp4) 38.9 ml/m^2      Ao root area (BSA corrected) 1.9     LV Hagan Vol (BSA corrected) 61.2 ml/m^2      LV Sys Vol (BSA corrected) 22.3 ml/m^2      Aortic R-R 0.66 sec      Aortic HR 90.6 BPM      MV E max josefina 95.0 cm/sec      MV A max josefina 78.5 cm/sec      MV E/A 1.2     MV V2 max 118.4 cm/sec      MV max PG 5.6 mmHg      MV V2 mean 83.7 cm/sec      MV mean PG 3.1 mmHg      MV V2 VTI 19.4 cm      MVA(VTI) 3.3 cm^2      MV dec slope 617.6 cm/sec^2      MV dec time 0.15 sec      Ao pk josefina 176.6 cm/sec      Ao max PG 12.5 mmHg      Ao max PG (full) 5.5 mmHg      Ao V2 mean 125.8 cm/sec      Ao mean PG 7.0 mmHg      Ao mean PG (full) 3.8 mmHg      Ao V2 VTI 36.4 cm      JAEL(I,A) 1.7 cm^2      JAEL(I,D) 1.7 cm^2      JAEL(V,A) 1.7 cm^2      JAEL(V,D) 1.7 cm^2      LV V1 max PG 7.0 mmHg      LV V1 mean PG 3.2 mmHg      LV V1 max 131.8 cm/sec      LV V1 mean 81.7 cm/sec      LV V1 VTI  28.0 cm      CO(Ao) 23.8 l/min      CI(Ao) 14.7 l/min/m^2      SV(Ao) 262.2 ml      SI(Ao) 162.4 ml/m^2      CO(LVOT) 5.7 l/min      CI(LVOT) 3.6 l/min/m^2      SV(LVOT) 63.3 ml      SI(LVOT) 39.3 ml/m^2      PA V2 max 130.8 cm/sec      PA max PG 6.9 mmHg      PA max PG (full) 5.2 mmHg      PA acc time 0.15 sec      PI end-d chris 170.3 cm/sec      PI max chris 246.5 cm/sec      PI max PG 24.3 mmHg      RV V1 max PG 1.7 mmHg      RV V1 mean PG 1.0 mmHg      RV V1 max 65.2 cm/sec      RV V1 mean 48.2 cm/sec      RV V1 VTI 13.9 cm      TR max chris 267.3 cm/sec      RVSP(TR) 31.6 mmHg      RAP systole 3.0 mmHg      PA pr(Accel) 12.2 mmHg      Pulm Sys Chris 73.3 cm/sec      Pulm Hagan Chris 61.5 cm/sec      Pulm S/D 1.2      CV ECHO [place] - BZI_BMI 27.7 kilograms/m^2       CV ECHO [place] - BSA(HAYCOCK) 1.7 m^2       CV ECHO [place] - BZI_METRIC_WEIGHT 64.4 kg       CV ECHO [place] - BZI_METRIC_HEIGHT 152.4 cm      EF(MOD-bp) 64.0 %      LA dimension(2D) 3.2 cm      Echo EF Estimated 60 %     Narrative:         · Estimated EF = 60%.  · Left ventricular systolic function is normal.     Indications  Dyspnea    Technically satisfactory study.  Mitral valve is structurally normal.  Tricuspid valve is structurally normal.  Aortic valve is structurally normal.  Pulmonic valve could not be well visualized.  No evidence for mitral tricuspid or aortic regurgitation is seen by   Doppler study.  Left atrium is normal in size.  Right atrium is normal in size.  Left ventricle is normal in size and contractility with ejection fraction   of 60%.  Right ventricle is normal in size.  Atrial septum is intact.  Aorta is normal.  No pericardial effusion or intracardiac thrombus is seen.    Impression  Structurally and functionally normal cardiac valves.  Normal left ventricle size and contractility with ejection fraction of   60%.  Normal echo Doppler study.  Left ventricular ejection fraction is 60%.              Results for orders placed during the  hospital encounter of 10/26/19   Duplex Venous Lower Extremity - Bilateral CAR    Narrative · Normal bilateral lower extremity venous duplex scan.          Results for orders placed during the hospital encounter of 10/26/19   Adult Transthoracic Echo Complete W/ Cont if Necessary Per Protocol    Narrative · Estimated EF = 60%.  · Left ventricular systolic function is normal.     Indications  Dyspnea    Technically satisfactory study.  Mitral valve is structurally normal.  Tricuspid valve is structurally normal.  Aortic valve is structurally normal.  Pulmonic valve could not be well visualized.  No evidence for mitral tricuspid or aortic regurgitation is seen by   Doppler study.  Left atrium is normal in size.  Right atrium is normal in size.  Left ventricle is normal in size and contractility with ejection fraction   of 60%.  Right ventricle is normal in size.  Atrial septum is intact.  Aorta is normal.  No pericardial effusion or intracardiac thrombus is seen.    Impression  Structurally and functionally normal cardiac valves.  Normal left ventricle size and contractility with ejection fraction of   60%.  Normal echo Doppler study.  Left ventricular ejection fraction is 60%.           Ct Abdomen Pelvis With Contrast    Result Date: 10/26/2019   1. Abnormal thickening of the wall of the distal small bowel including the terminal ileum. This can be seen with inflammatory bowel disease or infectious enteritis. 2. There are cystic areas seen in both adnexa. I cannot completely exclude bilateral tubo-ovarian abscesses. This could be causing reactive thickening of the wall of the small bowel. I would recommend clinical correlation. 3. There is suggestion of a small abscess in the upper pelvis adjacent to the right common iliac artery. There appears to be an organizing fluid collection in the presacral space suspicious for a developing abscess. 4. Uterine fibroids. 5. Inflammatory changes are seen throughout the  intrapelvic fat. 6. Additional findings as noted above.  Electronically Signed By-Denis Malhotra On:10/26/2019 9:57 AM This report was finalized on [id number] by  Denis Malhotra, .    Xr Chest 1 View    Result Date: 10/27/2019  Limited study demonstrating low lung volumes with bibasilar opacities that likely represent atelectasis. Superimposed pneumonia not entirely excluded.  Electronically Signed By-Claudy Cardoza On:10/27/2019 7:27 PM This report was finalized on [id number] by  Claudy Cardoza, .    Ct Chest Pulmonary Embolism    Result Date: 10/27/2019  1.  Moderate bilateral infiltrates and bibasilar areas of consolidation suggestive of pneumonia and/or atelectasis. Electronically signed by:  Vadim Godinez M.D.  10/27/2019 6:59 PM      Xrays, labs reviewed personally by physician.    Medication Review:   I have reviewed the patient's current medication list      Scheduled Meds    ceftriaxone 2 g Intravenous Q24H   enoxaparin 40 mg Subcutaneous Daily   famotidine 20 mg Intravenous Q12H   PARoxetine 40 mg Oral Daily   polyethylene glycol 17 g Oral BID   sodium chloride 10 mL Intravenous Q12H   sodium chloride 3 mL Intravenous Q12H       Meds Infusions       Meds PRN  •  acetaminophen **OR** acetaminophen **OR** acetaminophen  •  bisacodyl  •  docusate sodium  •  HYDROcodone-acetaminophen  •  HYDROcodone-acetaminophen  •  HYDROmorphone **AND** naloxone  •  ibuprofen  •  ipratropium-albuterol  •  melatonin  •  [DISCONTINUED] Morphine **AND** naloxone  •  ondansetron **OR** ondansetron  •  potassium & sodium phosphates **OR** potassium & sodium phosphates  •  potassium chloride  •  potassium chloride  •  promethazine **OR** promethazine  •  sodium chloride  •  sodium chloride    Assessment / Plan    Active Hospital Problems    Diagnosis  POA   • Generalized abdominal pain [R10.84]  Yes      Resolved Hospital Problems   No resolved problems to display.       Acute abdominal pain secondary to pelvic abscess with abnormal CT  scan suggestive of pelvic abscess with the following details:  1. Abnormal thickening of the wall of the distal small bowel including  the terminal ileum. This can be seen with inflammatory bowel disease or  infectious enteritis.  2. There are cystic areas seen in both adnexa. I cannot completely  exclude bilateral tubo-ovarian abscesses. This could be causing reactive  thickening of the wall of the small bowel. I would recommend clinical  correlation.  3. There is suggestion of a small abscess in the upper pelvis adjacent  to the right common iliac artery. There appears to be an organizing  fluid collection in the presacral space suspicious for a developing  abscess.  4. Uterine fibroids.  -Continue Zosyn/doxycycline  - 10/26/2019 status post exploratory laparotomy with washout of intra-abdominal abscess and right salpingectomy.  Status post intraoperative gynecologic consultation with Dr. Griffith.  -Follow-up on pathology and culture results  -Negative hCg    -Leukocytosis/normocytic anemia likely related to acute illness.  Monitor for GI bleed    Sepsis on admission secondary intra-abdominal infection with leukocytosis and fever but normal lactic acid    History of depression, Sjogren's syndrome    Hypoxemia- resolved,now on room air        DVT prophylaxis SCD  GI prophylaxis Pepcid.    History of substance abuse on records but patient denies any.  Tobacco dependence  Consider follow-up with GI eventually given CT scan abnormalities distal small bowel    PT Recommendation and Plan  Likely home whenever stable       Discharge Planning    Destination      No service coordination in this encounter.      Durable Medical Equipment      No service coordination in this encounter.      Dialysis/Infusion      No service coordination in this encounter.      Home Medical Care      No service coordination in this encounter.      Therapy      No service coordination in this encounter.      Community Resources      No service  coordination in this encounter.          Jhonatan Jung Jr., MD  11/02/19  10:10 AM

## 2019-11-02 NOTE — PLAN OF CARE
Problem: Patient Care Overview  Goal: Plan of Care Review  Outcome: Ongoing (interventions implemented as appropriate)      Problem: Pain, Chronic (Adult)  Goal: Acceptable Pain/Comfort Level and Functional Ability  Outcome: Ongoing (interventions implemented as appropriate)      Problem: Infection, Risk/Actual (Adult)  Goal: Infection Prevention/Resolution  Outcome: Ongoing (interventions implemented as appropriate)      Problem: Surgery Nonspecified (Adult)  Goal: Signs and Symptoms of Listed Potential Problems Will be Absent, Minimized or Managed (Surgery Nonspecified)  Outcome: Ongoing (interventions implemented as appropriate)

## 2019-11-02 NOTE — PROGRESS NOTES
LOS: 6 days   Patient Care Team:  José Antonio Gaspar MD as PCP - General  José Antonio Gaspar MD as PCP - Family Medicine    Chief Complaint: She has much less nausea today    Subjective      The patient has been afebrile for the last 24 hours.  The patient now on room air, hemodynamically stable, and is tolerating antimicrobial therapy.    Review of Systems:   Review of Systems   HENT: Negative.    Respiratory:        Much improved   Gastrointestinal: Positive for abdominal pain.   Genitourinary: Negative.    Musculoskeletal: Positive for arthralgias and back pain.   Skin: Negative.    Neurological: Negative.         Objective     Vital Signs  Temp:  [98.6 °F (37 °C)-99.4 °F (37.4 °C)] 98.7 °F (37.1 °C)  Heart Rate:  [73-90] 73  Resp:  [11-17] 11  BP: ()/(59-66) 97/61    Physical Exam:  Physical Exam   Constitutional: She is oriented to person, place, and time. She appears well-developed and well-nourished.   HENT:   Head: Normocephalic and atraumatic.   Eyes: Conjunctivae and EOM are normal. Pupils are equal, round, and reactive to light.   Neck: Neck supple.   Cardiovascular: Normal rate, regular rhythm and normal heart sounds.   Pulmonary/Chest: Effort normal and breath sounds normal.   Abdominal: Soft. Bowel sounds are normal.   Abdominal midline incision appears to be healing well, MORENA drain with serosanguineous drainage    Musculoskeletal: Normal range of motion.   Neurological: She is alert and oriented to person, place, and time.   Skin: Skin is warm and dry.   Psychiatric: She has a normal mood and affect.   Vitals reviewed.       Results Review:    I have reviewed all clinical data, test, lab, and imaging results.     Radiology  No Radiology Exams Resulted Within Past 24 Hours    Cardiology      Laboratory  Lab Results (last 24 hours)     Procedure Component Value Units Date/Time    Comprehensive Metabolic Panel [[id number]]  (Abnormal) Collected:  11/02/19 0417    Specimen:  Blood Updated:  11/02/19  0518     Glucose 100 mg/dL      BUN 9 mg/dL      Creatinine 0.57 mg/dL      Sodium 137 mmol/L      Potassium 4.1 mmol/L      Chloride 101 mmol/L      CO2 24.0 mmol/L      Calcium 9.2 mg/dL      Total Protein 7.3 g/dL      Albumin 3.30 g/dL      ALT (SGPT) 34 U/L      AST (SGOT) 54 U/L      Alkaline Phosphatase 155 U/L      Total Bilirubin 0.2 mg/dL      eGFR Non African Amer 115 mL/min/1.73      Globulin 4.0 gm/dL      A/G Ratio 0.8 g/dL      BUN/Creatinine Ratio 15.8     Anion Gap 12.0 mmol/L     Narrative:       GFR Normal >60  Chronic Kidney Disease <60  Kidney Failure <15    CBC & Differential [[id number]] Collected:  11/02/19 0417    Specimen:  Blood Updated:  11/02/19 0449    Narrative:       The following orders were created for panel order CBC & Differential.  Procedure                               Abnormality         Status                     ---------                               -----------         ------                     CBC Auto Differential[[id number]]        Abnormal            Final result                 Please view results for these tests on the individual orders.    CBC Auto Differential [[id number]]  (Abnormal) Collected:  11/02/19 0417    Specimen:  Blood Updated:  11/02/19 0449     WBC 7.30 10*3/mm3      RBC 3.50 10*6/mm3      Hemoglobin 9.9 g/dL      Hematocrit 29.6 %      MCV 84.5 fL      MCH 28.4 pg      MCHC 33.6 g/dL      RDW 15.7 %      RDW-SD 46.8 fl      MPV 7.6 fL      Platelets 626 10*3/mm3      Neutrophil % 62.7 %      Lymphocyte % 23.3 %      Monocyte % 9.0 %      Eosinophil % 4.8 %      Basophil % 0.2 %      Neutrophils, Absolute 4.60 10*3/mm3      Lymphocytes, Absolute 1.70 10*3/mm3      Monocytes, Absolute 0.70 10*3/mm3      Eosinophils, Absolute 0.40 10*3/mm3      Basophils, Absolute 0.00 10*3/mm3      nRBC 0.1 /100 WBC           Microbiology     Microbiology Results (last 10 days)     Procedure Component Value - Date/Time    Respiratory Panel, PCR - Swab, Nasopharynx  [[id number]]  (Normal) Collected:  10/27/19 2218    Lab Status:  Final result Specimen:  Swab from Nasopharynx Updated:  10/27/19 2355     ADENOVIRUS, PCR Not Detected     Coronavirus 229E Not Detected     Coronavirus HKU1 Not Detected     Coronavirus NL63 Not Detected     Coronavirus OC43 Not Detected     Human Metapneumovirus Not Detected     Human Rhinovirus/Enterovirus Not Detected     Influenza B PCR Not Detected     Parainfluenza Virus 1 Not Detected     Parainfluenza Virus 2 Not Detected     Parainfluenza Virus 3 Not Detected     Parainfluenza Virus 4 Not Detected     Bordetella pertussis pcr Not Detected     Influenza A H1 2009 PCR Not Detected     Chlamydophila pneumoniae PCR Not Detected     Mycoplasma pneumo by PCR Not Detected     Influenza A PCR Not Detected     Influenza A H3 Not Detected     Influenza A H1 Not Detected     RSV, PCR Not Detected    S. Pneumo Ag Urine or CSF - Urine, Urine, Clean Catch [[id number]]  (Normal) Collected:  10/27/19 2217    Lab Status:  Final result Specimen:  Urine, Clean Catch Updated:  10/27/19 2309     Strep Pneumo Ag Negative    Legionella Antigen, Urine - Urine, Urine, Clean Catch [[id number]]  (Normal) Collected:  10/27/19 2217    Lab Status:  Final result Specimen:  Urine, Clean Catch Updated:  10/27/19 2309     LEGIONELLA ANTIGEN, URINE Negative    Chlamydia trachomatis, Neisseria gonorrhoeae, PCR - Urine, Urine, Clean Catch [[id number]]  (Normal) Collected:  10/27/19 2217    Lab Status:  Final result Specimen:  Urine, Clean Catch Updated:  10/28/19 0041     Chlamydia DNA by PCR Not Detected     Neisseria gonorrhoeae by PCR Not Detected    Anaerobic Culture - Peritoneal Fluid, Peritoneum [[id number]] Collected:  10/26/19 1825    Lab Status:  Final result Specimen:  Peritoneal Fluid from Peritoneum Updated:  10/31/19 1017     Anaerobic Culture No anaerobes isolated at 5 days    Body Fluid Culture - Peritoneal Fluid, Peritoneum [[id number]]  (Abnormal)  (Susceptibility)  "Collected:  10/26/19 1825    Lab Status:  Final result Specimen:  Peritoneal Fluid from Peritoneum Updated:  10/28/19 0850     Body Fluid Culture Scant growth (1+) Escherichia coli     Gram Stain Many (4+) WBCs per low power field      No organisms seen    Susceptibility      Escherichia coli     JANNY     Ampicillin Susceptible     Ampicillin + Sulbactam Susceptible     Cefazolin Susceptible     Cefepime Susceptible     Ceftazidime Susceptible     Ceftriaxone Susceptible     Gentamicin Susceptible     Levofloxacin Resistant     Piperacillin + Tazobactam Susceptible     Trimethoprim + Sulfamethoxazole Resistant                    Blood Culture - Blood, Arm, Right [[id number]] Collected:  10/26/19 0836    Lab Status:  Final result Specimen:  Blood from Arm, Right Updated:  10/31/19 0900     Blood Culture No growth at 5 days    Blood Culture - Blood, Arm, Left [[id number]] Collected:  10/26/19 0809    Lab Status:  Final result Specimen:  Blood from Arm, Left Updated:  10/31/19 0830     Blood Culture No growth at 5 days          Medication Review:     Hospital Medications (active)       Dose Frequency Start End    !Vancomycin Level Draw Needed  Once 10/30/2019     Sig - Route: 1 (One) Time. - Does not apply    !Vancomycin Level Draw Needed  Once 10/30/2019     Sig - Route: 1 (One) Time. - Does not apply    acetaminophen (TYLENOL) 160 MG/5ML solution 650 mg 650 mg Every 4 Hours PRN 10/26/2019     Sig - Route: Take 20.3 mL by mouth Every 4 (Four) Hours As Needed for Mild Pain . - Oral    Linked Group 1:  \"Or\" Linked Group Details        acetaminophen (TYLENOL) suppository 650 mg 650 mg Every 4 Hours PRN 10/26/2019     Sig - Route: Insert 1 suppository into the rectum Every 4 (Four) Hours As Needed for Mild Pain . - Rectal    Linked Group 1:  \"Or\" Linked Group Details        acetaminophen (TYLENOL) tablet 650 mg 650 mg Every 4 Hours PRN 10/26/2019     Sig - Route: Take 2 tablets by mouth Every 4 (Four) Hours As Needed for " "Mild Pain . - Oral    Linked Group 1:  \"Or\" Linked Group Details        bisacodyl (DULCOLAX) EC tablet 10 mg 10 mg Daily PRN 10/26/2019     Sig - Route: Take 2 tablets by mouth Daily As Needed for Constipation. - Oral    butalbital-acetaminophen-caffeine (FIORICET, ESGIC) -40 MG per tablet 2 tablet 2 tablet Once 10/29/2019 10/29/2019    Sig - Route: Take 2 tablets by mouth 1 (One) Time. - Oral    docusate sodium (COLACE) capsule 100 mg 100 mg 2 Times Daily PRN 10/26/2019     Sig - Route: Take 1 capsule by mouth 2 (Two) Times a Day As Needed for Constipation. - Oral    enoxaparin (LOVENOX) syringe 40 mg 40 mg Daily 10/26/2019     Sig - Route: Inject 0.4 mL under the skin into the appropriate area as directed Daily. - Subcutaneous    famotidine (PEPCID) injection 20 mg 20 mg Every 12 Hours Scheduled 10/26/2019     Sig - Route: Infuse 2 mL into a venous catheter Every 12 (Twelve) Hours. - Intravenous    furosemide (LASIX) injection 40 mg 40 mg 2 Times Daily (Diuretics) 10/28/2019     Sig - Route: Infuse 4 mL into a venous catheter 2 (Two) Times a Day. - Intravenous    HYDROcodone-acetaminophen (NORCO)  MG per tablet 1 tablet 1 tablet Every 4 Hours PRN 10/26/2019 11/5/2019    Sig - Route: Take 1 tablet by mouth Every 4 (Four) Hours As Needed for Severe Pain . - Oral    Cosign for Ordering: Accepted by Jessica Taylor MD on 10/27/2019 11:01 AM    HYDROcodone-acetaminophen (NORCO) 5-325 MG per tablet 1 tablet 1 tablet Every 4 Hours PRN 10/26/2019 11/5/2019    Sig - Route: Take 1 tablet by mouth Every 4 (Four) Hours As Needed for Moderate Pain . - Oral    Cosign for Ordering: Accepted by Jessica Taylor MD on 10/27/2019 11:01 AM    HYDROmorphone (DILAUDID) injection 0.5 mg 0.5 mg Every 2 Hours PRN 10/27/2019 11/5/2019    Sig - Route: Infuse 0.25 mL into a venous catheter Every 2 (Two) Hours As Needed for Severe Pain . - Intravenous    Linked Group 2:  \"And\" Linked Group Details        ibuprofen " "(ADVIL,MOTRIN) tablet 200 mg 200 mg Every 6 Hours PRN 10/28/2019     Sig - Route: Take 1 tablet by mouth Every 6 (Six) Hours As Needed for Mild Pain . - Oral    ipratropium-albuterol (DUO-NEB) nebulizer solution 3 mL 3 mL Every 4 Hours - RT 10/27/2019     Sig - Route: Take 3 mL by nebulization Every 4 (Four) Hours. - Nebulization    magnesium hydroxide (MILK OF MAGNESIA) suspension 2400 mg/10mL 10 mL 10 mL Daily PRN 10/26/2019     Sig - Route: Take 10 mL by mouth Daily As Needed for Constipation. - Oral    Magnesium Sulfate 2 gram / 50mL Infusion (GIVE X 3 BAGS TO EQUAL 6GM TOTAL DOSE) - Mg 1.1 - 1.5 mg/dl 2 g As Needed 10/26/2019     Sig - Route: Infuse 50 mL into a venous catheter As Needed (See Administration Instructions). - Intravenous    Linked Group 3:  \"Or\" Linked Group Details        Magnesium Sulfate 2 gram Bolus, followed by 8 gram infusion (total Mg dose 10 grams)- Mg less than or equal to 1mg/dL 2 g As Needed 10/26/2019     Sig - Route: Infuse 50 mL into a venous catheter As Needed (See Administration Instructions). - Intravenous    Linked Group 3:  \"Or\" Linked Group Details        Magnesium Sulfate 4 gram infusion- Mg 1.6-1.9 mg/dL 4 g As Needed 10/26/2019     Sig - Route: Infuse 100 mL into a venous catheter As Needed (See Administration Instructions). - Intravenous    Linked Group 3:  \"Or\" Linked Group Details        melatonin tablet 5 mg 5 mg Nightly PRN 10/26/2019     Sig - Route: Take 1 tablet by mouth At Night As Needed for Sleep. - Oral    meropenem (MERREM) 500 mg in sodium chloride 0.9 % 100 mL IVPB 500 mg Every 6 Hours 10/28/2019 11/4/2019    Sig - Route: Infuse 500 mg into a venous catheter Every 6 (Six) Hours. - Intravenous    naloxone (NARCAN) injection 0.1 mg 0.1 mg Every 5 Minutes PRN 10/27/2019     Sig - Route: Infuse 0.25 mL into a venous catheter Every 5 (Five) Minutes As Needed for Respiratory Depression. - Intravenous    Linked Group 2:  \"And\" Linked Group Details        naloxone " "(NARCAN) injection 0.4 mg 0.4 mg Every 5 Minutes PRN 10/26/2019     Sig - Route: Infuse 1 mL into a venous catheter Every 5 (Five) Minutes As Needed for Respiratory Depression. - Intravenous    Linked Group 4:  \"And\" Linked Group Details        ondansetron (ZOFRAN) injection 4 mg 4 mg Every 6 Hours PRN 10/26/2019     Sig - Route: Infuse 2 mL into a venous catheter Every 6 (Six) Hours As Needed for Nausea or Vomiting. - Intravenous    Linked Group 5:  \"Or\" Linked Group Details        ondansetron (ZOFRAN) tablet 4 mg 4 mg Every 6 Hours PRN 10/26/2019     Sig - Route: Take 1 tablet by mouth Every 6 (Six) Hours As Needed for Nausea or Vomiting. - Oral    Linked Group 5:  \"Or\" Linked Group Details        PARoxetine (PAXIL) tablet 40 mg 40 mg Daily 10/27/2019     Sig - Route: Take 2 tablets by mouth Daily. - Oral    Pharmacy to dose vancomycin  Continuous PRN 10/28/2019 11/4/2019    Sig - Route: Continuous As Needed (PNA). - Does not apply    potassium & sodium phosphates (PHOS-NAK) 280-160-250 MG packet - for Phosphorus 1.25 - 2.5 mg/dL 2 packet Every 6 Hours PRN 10/26/2019     Sig - Route: Take 2 packets by mouth Every 6 (Six) Hours As Needed (Phosphorus 1.25 - 2.5 mg/dL). - Oral    Linked Group 6:  \"Or\" Linked Group Details        potassium & sodium phosphates (PHOS-NAK) 280-160-250 MG packet - for Phosphorus less than 1.25 mg/dL 2 packet Every 6 Hours PRN 10/26/2019     Sig - Route: Take 2 packets by mouth Every 6 (Six) Hours As Needed (Phosphorus less than 1.25 mg/dL). - Oral    Linked Group 6:  \"Or\" Linked Group Details        potassium chloride (K-DUR,KLOR-CON) CR tablet 40 mEq 40 mEq As Needed 10/26/2019     Sig - Route: Take 2 tablets by mouth As Needed (Potassium Replacement.  See Admin Instructions). - Oral    potassium chloride (KLOR-CON) packet 40 mEq 40 mEq As Needed 10/26/2019     Sig - Route: Take 40 mEq by mouth As Needed (Potassium Replacement, See Admin Instructions). - Oral    promethazine (PHENERGAN) " "injection 12.5 mg 12.5 mg Every 6 Hours PRN 10/26/2019     Sig - Route: Inject 0.5 mL into the appropriate muscle as directed by prescriber Every 6 (Six) Hours As Needed for Nausea or Vomiting. - Intramuscular    Linked Group 7:  \"Or\" Linked Group Details        promethazine (PHENERGAN) IVPB 12.5 mg 12.5 mg Every 6 Hours PRN 10/26/2019     Sig - Route: Infuse 50 mL into a venous catheter Every 6 (Six) Hours As Needed for Nausea or Vomiting. - Intravenous    Linked Group 7:  \"Or\" Linked Group Details        sodium chloride 0.9 % flush 10 mL 10 mL Every 12 Hours Scheduled 10/26/2019     Sig - Route: Infuse 10 mL into a venous catheter Every 12 (Twelve) Hours. - Intravenous    sodium chloride 0.9 % flush 10 mL 10 mL As Needed 10/26/2019     Sig - Route: Infuse 10 mL into a venous catheter As Needed for Line Care. - Intravenous    sodium chloride 0.9 % flush 3 mL 3 mL Every 12 Hours Scheduled 10/26/2019     Sig - Route: Infuse 3 mL into a venous catheter Every 12 (Twelve) Hours. - Intravenous    sodium chloride 0.9 % flush 3-10 mL 3-10 mL As Needed 10/26/2019     Sig - Route: Infuse 3-10 mL into a venous catheter As Needed for Line Care. - Intravenous    vancomycin 1000 mg/250 mL 0.9% NS IVPB ([facility]) 20 mg/kg × 53.2 kg (Adjusted) Once 10/28/2019 10/28/2019    Sig - Route: Infuse 250 mL into a venous catheter 1 (One) Time. - Intravenous    vancomycin 750 mg in sodium chloride 0.9 % 100 mL IVPB 15 mg/kg × 53.2 kg (Adjusted) Every 12 Hours 10/29/2019 11/1/2019    Sig - Route: Infuse 750 mg into a venous catheter Every 12 (Twelve) Hours. - Intravenous    dextrose 5 % and sodium chloride 0.45 % infusion (Discontinued) 125 mL/hr Continuous 10/26/2019 10/28/2019    Sig - Route: Infuse 125 mL/hr into a venous catheter Continuous. - Intravenous    Reason for Discontinue: Discontinued by another clinician    doxycycline (VIBRAMYCIN) 100 mg in sodium chloride 0.9 % 100 mL IVPB (Discontinued) 100 mg Every 12 Hours 10/26/2019 " "10/28/2019    Sig - Route: Infuse 100 mg into a venous catheter Every 12 (Twelve) Hours. - Intravenous    piperacillin-tazobactam (ZOSYN) IVPB 3.375 g in 100 mL NS (CD) (Discontinued) 3.375 g Every 6 Hours 10/26/2019 10/28/2019    Sig - Route: Infuse 3.375 g into a venous catheter Every 6 (Six) Hours. - Intravenous    sodium chloride 0.9 % flush 10 mL (Discontinued) 10 mL As Needed 10/26/2019 10/28/2019    Sig - Route: Infuse 10 mL into a venous catheter As Needed for Line Care. - Intravenous    Reason for Discontinue: Discontinued by another clinician    Linked Group 8:  \"And\" Linked Group Details                  Assessment/Plan     Antimicrobial Therapy   1 IV Rocephin      Assessment     Intra-abdominal/pelvic abscesses/ovarian abscess  -Intraoperative cultures was positive for E. coli.  Urine was negative for chlamydia and gonorrhea PCR.  The patient was treated initially with broad-spectrum antibiotics.  She was switch to IV Rocephin and IV Flagyl but she was not able to tolerate IV Flagyl secondary to severe nausea and vomiting     Hypoxia noted after admission.  Most likely secondary to fluid overload.  Patient improved significantly with diuresis and was on 15 L of oxygen and currently on 2 L     Patient has a history of ulcerative colitis and IBS with constipation  -Patient is not on any immunosuppressive medications     History of  and appendectomy     Sjogern's syndrome     Tobacco abuse           Plan     Continue IV Rocephin 2 g every 24 hours-May switch to p.o. Omnicef 300mg q12h at discharge for 2 weeks total treatment   Continue supportive care  Okay to discharge infectious disease standpoint when okay with general surgery.  General surgery service waiting on patient to start having bowel movements.  Case was discussed with general surgery service    Pippa Amezquita MD  19  2:27 PM    Note is dictated utilizing voice recognition software/Dragon  "

## 2019-11-02 NOTE — PROGRESS NOTES
General Surgery Progress Note     LOS: 6 days   Patient Care Team:  José Antonio Gaspar MD as PCP - General  José Antonio Gaspar MD as PCP - Family Medicine    Subjective     Chief Complaint   Patient presents with   • Abdominal Pain     abdominal pain with no bowel movement x 6 days       Interval History:   Continues to feel fine.  Tolerating regular food.  Plenty of gas but no bowel movement.    Objective     Vital Signs  Temp:  [98.4 °F (36.9 °C)-99.4 °F (37.4 °C)] 98.4 °F (36.9 °C)  Heart Rate:  [73-86] 85  Resp:  [11-17] 14  BP: ()/(61-67) 100/67    Physical Exam   Constitutional: She appears well-developed and well-nourished.   HENT:   Head: Normocephalic and atraumatic.   Eyes: EOM are normal.   Neck: Normal range of motion.   Cardiovascular: Normal rate.   Mild pallor; no cyanosis   Pulmonary/Chest: Effort normal. No respiratory distress.   Abdominal: Soft.   MORENA serosanguineous  Incision clean and dry   Neurological: She is alert.   Skin: Skin is warm and dry.   Psychiatric: She has a normal mood and affect. Her behavior is normal.   Vitals reviewed.           Results Review:    Lab Results (last 24 hours)     Procedure Component Value Units Date/Time    Comprehensive Metabolic Panel [[id number]]  (Abnormal) Collected:  11/02/19 0417    Specimen:  Blood Updated:  11/02/19 0518     Glucose 100 mg/dL      BUN 9 mg/dL      Creatinine 0.57 mg/dL      Sodium 137 mmol/L      Potassium 4.1 mmol/L      Chloride 101 mmol/L      CO2 24.0 mmol/L      Calcium 9.2 mg/dL      Total Protein 7.3 g/dL      Albumin 3.30 g/dL      ALT (SGPT) 34 U/L      AST (SGOT) 54 U/L      Alkaline Phosphatase 155 U/L      Total Bilirubin 0.2 mg/dL      eGFR Non African Amer 115 mL/min/1.73      Globulin 4.0 gm/dL      A/G Ratio 0.8 g/dL      BUN/Creatinine Ratio 15.8     Anion Gap 12.0 mmol/L     Narrative:       GFR Normal >60  Chronic Kidney Disease <60  Kidney Failure <15    CBC & Differential [[id number]] Collected:  11/02/19 0417     Specimen:  Blood Updated:  11/02/19 0449    Narrative:       The following orders were created for panel order CBC & Differential.  Procedure                               Abnormality         Status                     ---------                               -----------         ------                     CBC Auto Differential[[id number]]        Abnormal            Final result                 Please view results for these tests on the individual orders.    CBC Auto Differential [[id number]]  (Abnormal) Collected:  11/02/19 0417    Specimen:  Blood Updated:  11/02/19 0449     WBC 7.30 10*3/mm3      RBC 3.50 10*6/mm3      Hemoglobin 9.9 g/dL      Hematocrit 29.6 %      MCV 84.5 fL      MCH 28.4 pg      MCHC 33.6 g/dL      RDW 15.7 %      RDW-SD 46.8 fl      MPV 7.6 fL      Platelets 626 10*3/mm3      Neutrophil % 62.7 %      Lymphocyte % 23.3 %      Monocyte % 9.0 %      Eosinophil % 4.8 %      Basophil % 0.2 %      Neutrophils, Absolute 4.60 10*3/mm3      Lymphocytes, Absolute 1.70 10*3/mm3      Monocytes, Absolute 0.70 10*3/mm3      Eosinophils, Absolute 0.40 10*3/mm3      Basophils, Absolute 0.00 10*3/mm3      nRBC 0.1 /100 WBC         Imaging Results (Last 24 Hours)     ** No results found for the last 24 hours. **          I reviewed the patient's new clinical results.    Medication Review:    Current Facility-Administered Medications:   •  acetaminophen (TYLENOL) tablet 650 mg, 650 mg, Oral, Q4H PRN, 650 mg at 11/01/19 2224 **OR** acetaminophen (TYLENOL) 160 MG/5ML solution 650 mg, 650 mg, Oral, Q4H PRN **OR** acetaminophen (TYLENOL) suppository 650 mg, 650 mg, Rectal, Q4H PRN, Odilon Velez MD  •  bisacodyl (DULCOLAX) suppository 10 mg, 10 mg, Rectal, Daily PRN, Oswaldo Tran MD  •  cefTRIAXone (ROCEPHIN) 2 g in sodium chloride 0.9 % 100 mL IVPB, 2 g, Intravenous, Q24H, Altagracia Zambrano, YOUSUF, Last Rate: 200 mL/hr at 11/01/19 2224, 2 g at 11/01/19 2224  •  docusate sodium (COLACE)  capsule 100 mg, 100 mg, Oral, BID PRN, Jessica Taylor MD  •  enoxaparin (LOVENOX) syringe 40 mg, 40 mg, Subcutaneous, Daily, Jessica Taylor MD, 40 mg at 11/02/19 1600  •  famotidine (PEPCID) injection 20 mg, 20 mg, Intravenous, Q12H, Odilon Velez MD, 20 mg at 11/02/19 0954  •  HYDROcodone-acetaminophen (NORCO)  MG per tablet 1 tablet, 1 tablet, Oral, Q4H PRN, Jessica Taylor MD, 1 tablet at 11/02/19 1600  •  HYDROcodone-acetaminophen (NORCO) 5-325 MG per tablet 1 tablet, 1 tablet, Oral, Q4H PRN, Jessica Taylor MD  •  HYDROmorphone (DILAUDID) injection 0.5 mg, 0.5 mg, Intravenous, Q2H PRN **AND** naloxone (NARCAN) injection 0.1 mg, 0.1 mg, Intravenous, Q5 Min PRN, Odilon Velez MD  •  ibuprofen (ADVIL,MOTRIN) tablet 200 mg, 200 mg, Oral, Q6H PRN, Jhonatan Jung Jr., MD, 200 mg at 10/29/19 1345  •  ipratropium-albuterol (DUO-NEB) nebulizer solution 3 mL, 3 mL, Nebulization, Q4H PRN, Jhonatan Jung Jr., MD  •  melatonin tablet 5 mg, 5 mg, Oral, Nightly PRN, Odilon Velez MD  •  [DISCONTINUED] Morphine sulfate (PF) injection 4 mg, 4 mg, Intravenous, Q2H PRN, 4 mg at 10/27/19 0720 **AND** naloxone (NARCAN) injection 0.4 mg, 0.4 mg, Intravenous, Q5 Min PRN, Jessica Taylor MD  •  ondansetron (ZOFRAN) tablet 4 mg, 4 mg, Oral, Q6H PRN **OR** ondansetron (ZOFRAN) injection 4 mg, 4 mg, Intravenous, Q6H PRN, Jessica Taylor MD, 4 mg at 10/30/19 0746  •  PARoxetine (PAXIL) tablet 40 mg, 40 mg, Oral, Daily, Aye Lantigua FNP, 40 mg at 11/02/19 0954  •  polyethylene glycol (MIRALAX) powder 17 g, 17 g, Oral, BID, Oswaldo Tran MD, 17 g at 11/02/19 0955  •  potassium & sodium phosphates (PHOS-NAK) 280-160-250 MG packet - for Phosphorus less than 1.25 mg/dL, 2 packet, Oral, Q6H PRN **OR** potassium & sodium phosphates (PHOS-NAK) 280-160-250 MG packet - for Phosphorus 1.25 - 2.5 mg/dL, 2 packet, Oral, Q6H PRN, Odilon Velez,  MD  •  potassium chloride (K-DUR,KLOR-CON) CR tablet 40 mEq, 40 mEq, Oral, PRN, Agustin, Odilon Gates MD, 40 mEq at 10/31/19 1208  •  potassium chloride (KLOR-CON) packet 40 mEq, 40 mEq, Oral, PRN, Agustin, Odilon Gates MD  •  promethazine (PHENERGAN) IVPB 12.5 mg, 12.5 mg, Intravenous, Q6H PRN **OR** promethazine (PHENERGAN) injection 12.5 mg, 12.5 mg, Intramuscular, Q6H PRN, Jessica Taylor MD  •  sodium chloride 0.9 % flush 10 mL, 10 mL, Intravenous, Q12H, Odilon Velez MD, 10 mL at 11/02/19 0900  •  sodium chloride 0.9 % flush 10 mL, 10 mL, Intravenous, PRN, Odilon Velez MD  •  sodium chloride 0.9 % flush 3 mL, 3 mL, Intravenous, Q12H, Jessica Taylor MD, 3 mL at 11/02/19 0954  •  sodium chloride 0.9 % flush 3-10 mL, 3-10 mL, Intravenous, PRN, Jessica Taylor MD    Assessment/Plan     Active Problems:    Generalized abdominal pain  Status post ex lap, right salpingectomy POD 6    Surgically stable and feeling well  Await bowel movement  We will give Dulcolax suppository    If bowel movement may go home on antibiotic regimen    Follow-up with Dr. Taylor in 1 week.  Okay to shower 24 hours after drain removal  Do not soak in a tub  Do not lift more than 15 pounds for about a month    Oswaldo Tran MD  11/02/19  4:09 PM

## 2019-11-03 VITALS
DIASTOLIC BLOOD PRESSURE: 69 MMHG | TEMPERATURE: 98.6 F | SYSTOLIC BLOOD PRESSURE: 106 MMHG | RESPIRATION RATE: 16 BRPM | HEIGHT: 60 IN | HEART RATE: 97 BPM | BODY MASS INDEX: 27.66 KG/M2 | OXYGEN SATURATION: 96 % | WEIGHT: 140.87 LBS

## 2019-11-03 PROCEDURE — 99238 HOSP IP/OBS DSCHRG MGMT 30/<: CPT | Performed by: INTERNAL MEDICINE

## 2019-11-03 RX ORDER — CEFDINIR 300 MG/1
300 CAPSULE ORAL 2 TIMES DAILY
Qty: 20 CAPSULE | Refills: 0 | Status: SHIPPED | OUTPATIENT
Start: 2019-11-03

## 2019-11-03 RX ORDER — HYDROCODONE BITARTRATE AND ACETAMINOPHEN 5; 325 MG/1; MG/1
1 TABLET ORAL EVERY 4 HOURS PRN
Qty: 24 TABLET | Refills: 0 | Status: SHIPPED | OUTPATIENT
Start: 2019-11-03 | End: 2019-11-05

## 2019-11-03 RX ADMIN — PAROXETINE HYDROCHLORIDE 40 MG: 20 TABLET, FILM COATED ORAL at 08:27

## 2019-11-03 RX ADMIN — Medication 3 ML: at 08:28

## 2019-11-03 RX ADMIN — Medication 10 ML: at 08:28

## 2019-11-03 RX ADMIN — HYDROCODONE BITARTRATE AND ACETAMINOPHEN 1 TABLET: 10; 325 TABLET ORAL at 08:27

## 2019-11-03 NOTE — PLAN OF CARE
Problem: Surgery Nonspecified (Adult)  Goal: Signs and Symptoms of Listed Potential Problems Will be Absent, Minimized or Managed (Surgery Nonspecified)  Outcome: Outcome(s) achieved Date Met: 11/03/19

## 2019-11-03 NOTE — DISCHARGE INSTR - ACTIVITY
Follow-up with Dr. Taylor in 1 week.  Okay to shower 24 hours after drain removal  Do not soak in a tub  Do not lift more than 15 pounds for about a month

## 2019-11-03 NOTE — PLAN OF CARE
Problem: Patient Care Overview  Goal: Plan of Care Review  Outcome: Outcome(s) achieved Date Met: 11/03/19      Problem: Pain, Chronic (Adult)  Goal: Acceptable Pain/Comfort Level and Functional Ability  Outcome: Outcome(s) achieved Date Met: 11/03/19

## 2019-11-03 NOTE — DISCHARGE SUMMARY
Date of Admission: 10/26/2019    Date of Discharge:  11/3/2019    Length of stay:  LOS: 7 days     Discharge Diagnosis: abd pain with right salpingectomy    Presenting Problem/History of Present Illness  Active Hospital Problems    Diagnosis  POA   • **Generalized abdominal pain [R10.84]  Yes      Resolved Hospital Problems   No resolved problems to display.          Hospital Course  Patient is a 44 y.o. female presented with abdominal pain and had exploratory lap with right salpingectomy .She was tolerating a diet at discharge    Past Medical History:     Past Medical History:   Diagnosis Date   • Colitis    • Irritable bowel syndrome    • Sjogren's syndrome (CMS/[facility])    • Substance abuse (CMS/[facility])     pills/ clean for 10 years       Past Surgical History:     Past Surgical History:   Procedure Laterality Date   •  SECTION     • EXPLORATORY LAPAROTOMY N/A 10/26/2019    Procedure: LAPAROTOMY EXPLORATORY  WITH RIGHT SALPINGECTOMY;  Surgeon: Jessica Taylor MD;  Location: [facility] OR;  Service: General       Social History:   Social History     Socioeconomic History   • Marital status:      Spouse name: Not on file   • Number of children: Not on file   • Years of education: Not on file   • Highest education level: Not on file   Tobacco Use   • Smoking status: Current Every Day Smoker     Packs/day: 0.50     Years: 20.00     Pack years: 10.00     Types: Cigarettes   Substance and Sexual Activity   • Alcohol use: No     Frequency: Never   • Drug use: No   • Sexual activity: Defer       Procedures Performed    Procedure(s):  LAPAROTOMY EXPLORATORY  WITH RIGHT SALPINGECTOMY  -------------------       Consults:   Consults     Date and Time Order Name Status Description    10/28/2019 1012 Inpatient Infectious Diseases Consult Completed     10/27/2019 1823 Inpatient Pulmonology Consult Completed     10/26/2019 1012 Surgery (on-call MD unless specified) Completed           Pertinent Test Results:        Lab Results (most recent)     Procedure Component Value Units Date/Time    Comprehensive Metabolic Panel [[id number]]  (Abnormal) Collected:  11/02/19 0417    Specimen:  Blood Updated:  11/02/19 0518     Glucose 100 mg/dL      BUN 9 mg/dL      Creatinine 0.57 mg/dL      Sodium 137 mmol/L      Potassium 4.1 mmol/L      Chloride 101 mmol/L      CO2 24.0 mmol/L      Calcium 9.2 mg/dL      Total Protein 7.3 g/dL      Albumin 3.30 g/dL      ALT (SGPT) 34 U/L      AST (SGOT) 54 U/L      Alkaline Phosphatase 155 U/L      Total Bilirubin 0.2 mg/dL      eGFR Non African Amer 115 mL/min/1.73      Globulin 4.0 gm/dL      A/G Ratio 0.8 g/dL      BUN/Creatinine Ratio 15.8     Anion Gap 12.0 mmol/L     Narrative:       GFR Normal >60  Chronic Kidney Disease <60  Kidney Failure <15    CBC & Differential [[id number]] Collected:  11/02/19 0417    Specimen:  Blood Updated:  11/02/19 0449    Narrative:       The following orders were created for panel order CBC & Differential.  Procedure                               Abnormality         Status                     ---------                               -----------         ------                     CBC Auto Differential[[id number]]        Abnormal            Final result                 Please view results for these tests on the individual orders.    CBC Auto Differential [[id number]]  (Abnormal) Collected:  11/02/19 0417    Specimen:  Blood Updated:  11/02/19 0449     WBC 7.30 10*3/mm3      RBC 3.50 10*6/mm3      Hemoglobin 9.9 g/dL      Hematocrit 29.6 %      MCV 84.5 fL      MCH 28.4 pg      MCHC 33.6 g/dL      RDW 15.7 %      RDW-SD 46.8 fl      MPV 7.6 fL      Platelets 626 10*3/mm3      Neutrophil % 62.7 %      Lymphocyte % 23.3 %      Monocyte % 9.0 %      Eosinophil % 4.8 %      Basophil % 0.2 %      Neutrophils, Absolute 4.60 10*3/mm3      Lymphocytes, Absolute 1.70 10*3/mm3      Monocytes, Absolute 0.70 10*3/mm3      Eosinophils, Absolute 0.40 10*3/mm3      Basophils,  Absolute 0.00 10*3/mm3      nRBC 0.1 /100 WBC     Comprehensive Metabolic Panel [[id number]]  (Abnormal) Collected:  11/01/19 0401    Specimen:  Blood Updated:  11/01/19 0537     Glucose 89 mg/dL      BUN 12 mg/dL      Creatinine 0.61 mg/dL      Sodium 137 mmol/L      Potassium 4.2 mmol/L      Chloride 101 mmol/L      CO2 27.0 mmol/L      Calcium 9.1 mg/dL      Total Protein 7.3 g/dL      Albumin 3.30 g/dL      ALT (SGPT) 19 U/L      AST (SGOT) 27 U/L      Alkaline Phosphatase 126 U/L      Total Bilirubin 0.2 mg/dL      eGFR Non African Amer 107 mL/min/1.73      Globulin 4.0 gm/dL      A/G Ratio 0.8 g/dL      BUN/Creatinine Ratio 19.7     Anion Gap 9.0 mmol/L     Narrative:       GFR Normal >60  Chronic Kidney Disease <60  Kidney Failure <15    CBC & Differential [[id number]] Collected:  11/01/19 0401    Specimen:  Blood Updated:  11/01/19 0449    Narrative:       The following orders were created for panel order CBC & Differential.  Procedure                               Abnormality         Status                     ---------                               -----------         ------                     CBC Auto Differential[[id number]]        Abnormal            Final result                 Please view results for these tests on the individual orders.    CBC Auto Differential [[id number]]  (Abnormal) Collected:  11/01/19 0401    Specimen:  Blood Updated:  11/01/19 0449     WBC 8.50 10*3/mm3      RBC 3.51 10*6/mm3      Hemoglobin 9.8 g/dL      Hematocrit 29.8 %      MCV 84.8 fL      MCH 28.0 pg      MCHC 33.0 g/dL      RDW 16.1 %      RDW-SD 48.6 fl      MPV 7.5 fL      Platelets 617 10*3/mm3      Neutrophil % 61.4 %      Lymphocyte % 20.9 %      Monocyte % 10.4 %      Eosinophil % 6.4 %      Basophil % 0.9 %      Neutrophils, Absolute 5.20 10*3/mm3      Lymphocytes, Absolute 1.80 10*3/mm3      Monocytes, Absolute 0.90 10*3/mm3      Eosinophils, Absolute 0.50 10*3/mm3      Basophils, Absolute 0.10 10*3/mm3      nRBC  "0.0 /100 WBC     Potassium [[id number]]  (Normal) Collected:  10/31/19 1754    Specimen:  Blood Updated:  10/31/19 1855     Potassium 4.4 mmol/L     Anaerobic Culture - Peritoneal Fluid, Peritoneum [[id number]] Collected:  10/26/19 1825    Specimen:  Peritoneal Fluid from Peritoneum Updated:  10/31/19 1017     Anaerobic Culture No anaerobes isolated at 5 days    Blood Culture - Blood, Arm, Right [[id number]] Collected:  10/26/19 0836    Specimen:  Blood from Arm, Right Updated:  10/31/19 0900     Blood Culture No growth at 5 days    Blood Culture - Blood, Arm, Left [[id number]] Collected:  10/26/19 0809    Specimen:  Blood from Arm, Left Updated:  10/31/19 0830     Blood Culture No growth at 5 days    Potassium [[id number]]  (Abnormal) Collected:  10/29/19 2002    Specimen:  Blood Updated:  10/29/19 2025     Potassium 3.4 mmol/L     Tissue Pathology Exam [[id number]] Collected:  10/26/19 1844    Specimen:  Tissue from Fallopian Tube, Right Updated:  10/29/19 1455     Case Report --     Surgical Pathology Report                         Case: [id number]                                  Authorizing Provider:  Jessica Taylor MD       Collected:           10/26/2019 06:44 PM          Ordering Location:     [facility] MAIN  Received:            10/28/2019 07:50 AM                                 OR                                                                           Pathologist:           Ab Infante MD                                                            Specimen:    Fallopian Tube, Right, RIGHT FALLOPIAN TUBE                                                 Final Diagnosis --     Fallopian tube, right, salpingectomy:    Acute salpingitis and serositis    No malignancy identified    See comment      RACHEL/cec       Comment --     Clinical correlation with concurrent culture studies is recommended.      RACHEL/cec       Gross Description --     Received in formalin designated \"Right fallopian tube\" is a " salpingectomy specimen measuring 9.5 cm in length and varies from 0.8 to 1.8 cm in dimension. The serosa is pink. There are areas of whitish yellow probable purulent exudate on the surface. Sectioning reveals a whitish tan cut surface. No masses are identified. Representative sections of fallopian tube are submitted in three cassettes.     RACHEL/tkd        Body Fluid Culture - Peritoneal Fluid, Peritoneum [[id number]]  (Abnormal)  (Susceptibility) Collected:  10/26/19 1825    Specimen:  Peritoneal Fluid from Peritoneum Updated:  10/28/19 0850     Body Fluid Culture Scant growth (1+) Escherichia coli     Gram Stain Many (4+) WBCs per low power field      No organisms seen    Susceptibility      Escherichia coli     JANNY     Ampicillin Susceptible     Ampicillin + Sulbactam Susceptible     Cefazolin Susceptible     Cefepime Susceptible     Ceftazidime Susceptible     Ceftriaxone Susceptible     Gentamicin Susceptible     Levofloxacin Resistant     Piperacillin + Tazobactam Susceptible     Trimethoprim + Sulfamethoxazole Resistant                    Basic Metabolic Panel [[id number]]  (Abnormal) Collected:  10/28/19 0532    Specimen:  Blood Updated:  10/28/19 0645     Glucose 159 mg/dL      BUN 7 mg/dL      Creatinine 0.73 mg/dL      Sodium 141 mmol/L      Potassium 3.3 mmol/L      Chloride 112 mmol/L      CO2 18.0 mmol/L      Calcium 8.4 mg/dL      eGFR Non African Amer 87 mL/min/1.73      BUN/Creatinine Ratio 9.6     Anion Gap 11.0 mmol/L     Narrative:       GFR Normal >60  Chronic Kidney Disease <60  Kidney Failure <15    Chlamydia trachomatis, Neisseria gonorrhoeae, PCR - Urine, Urine, Clean Catch [[id number]]  (Normal) Collected:  10/27/19 2217    Specimen:  Urine, Clean Catch Updated:  10/28/19 0041     Chlamydia DNA by PCR Not Detected     Neisseria gonorrhoeae by PCR Not Detected    Respiratory Panel, PCR - Swab, Nasopharynx [[id number]]  (Normal) Collected:  10/27/19 2218    Specimen:  Swab from Nasopharynx Updated:   10/27/19 0745     ADENOVIRUS, PCR Not Detected     Coronavirus 229E Not Detected     Coronavirus HKU1 Not Detected     Coronavirus NL63 Not Detected     Coronavirus OC43 Not Detected     Human Metapneumovirus Not Detected     Human Rhinovirus/Enterovirus Not Detected     Influenza B PCR Not Detected     Parainfluenza Virus 1 Not Detected     Parainfluenza Virus 2 Not Detected     Parainfluenza Virus 3 Not Detected     Parainfluenza Virus 4 Not Detected     Bordetella pertussis pcr Not Detected     Influenza A H1 2009 PCR Not Detected     Chlamydophila pneumoniae PCR Not Detected     Mycoplasma pneumo by PCR Not Detected     Influenza A PCR Not Detected     Influenza A H3 Not Detected     Influenza A H1 Not Detected     RSV, PCR Not Detected    S. Pneumo Ag Urine or CSF - Urine, Urine, Clean Catch [[id number]]  (Normal) Collected:  10/27/19 2217    Specimen:  Urine, Clean Catch Updated:  10/27/19 2309     Strep Pneumo Ag Negative    Legionella Antigen, Urine - Urine, Urine, Clean Catch [[id number]]  (Normal) Collected:  10/27/19 2217    Specimen:  Urine, Clean Catch Updated:  10/27/19 2309     LEGIONELLA ANTIGEN, URINE Negative    Blood Gas, Arterial [[id number]]  (Abnormal) Collected:  10/27/19 1857    Specimen:  Arterial Blood Updated:  10/27/19 1901     Site Right Radial     Edvin's Test Positive     pH, Arterial 7.369 pH units      pCO2, Arterial 29.0 mm Hg      pO2, Arterial 57.1 mm Hg      HCO3, Arterial 16.7 mmol/L      Base Excess, Arterial -7.4 mmol/L      Comment: Serial Number: [id number]:  [id number]        O2 Saturation, Arterial 89.0 %      CO2 Content 17.6 mmol/L      Barometric Pressure for Blood Gas --     Comment: N/A        Modality Cannula     FIO2 36 %      Hemodilution No    Basic Metabolic Panel [[id number]]  (Abnormal) Collected:  10/27/19 0303    Specimen:  Blood Updated:  10/27/19 0349     Glucose 176 mg/dL      BUN 11 mg/dL      Creatinine 0.75 mg/dL      Sodium 138 mmol/L      Potassium 4.1  mmol/L      Chloride 111 mmol/L      CO2 18.0 mmol/L      Calcium 7.7 mg/dL      eGFR Non African Amer 84 mL/min/1.73      BUN/Creatinine Ratio 14.7     Anion Gap 9.0 mmol/L     Narrative:       GFR Normal >60  Chronic Kidney Disease <60  Kidney Failure <15    Urinalysis With Culture If Indicated - Urine, Catheter [[id number]]  (Abnormal) Collected:  10/26/19 0848    Specimen:  Urine, Catheter Updated:  10/26/19 0917     Color, UA Yellow     Appearance, UA Clear     pH, UA 6.5     Specific Gravity, UA 1.026     Glucose, UA Negative     Ketones, UA Negative     Bilirubin, UA Small (1+)     Comment: Confirmation testing is unavailable.  A serum bilirubin is recommended for further assessment.        Blood, UA Negative     Protein, UA Trace     Leuk Esterase, UA Negative     Nitrite, UA Negative     Urobilinogen, UA 2.0 E.U./dL    Narrative:       Urine microscopic not indicated.    Pregnancy, Urine - Urine, Clean Catch [[id number]]  (Normal) Collected:  10/26/19 0848    Specimen:  Urine, Clean Catch Updated:  10/26/19 0903     HCG, Urine QL Negative    Lipase [[id number]]  (Normal) Collected:  10/26/19 0809    Specimen:  Blood Updated:  10/26/19 0849     Lipase 16 U/L     POC Lactate [[id number]]  (Normal) Collected:  10/26/19 0842    Specimen:  Blood Updated:  10/26/19 0843     Lactate 1.0 mmol/L      Comment: Serial Number: [id number]:  [id number]       POC Lactate [[id number]]  (Normal) Collected:  10/26/19 0842    Specimen:  Blood Updated:  10/26/19 0842             Results for orders placed during the hospital encounter of 10/26/19   Adult Transthoracic Echo Complete W/ Cont if Necessary Per Protocol    Narrative · Estimated EF = 60%.  · Left ventricular systolic function is normal.     Indications  Dyspnea    Technically satisfactory study.  Mitral valve is structurally normal.  Tricuspid valve is structurally normal.  Aortic valve is structurally normal.  Pulmonic valve could not be well visualized.  No  evidence for mitral tricuspid or aortic regurgitation is seen by   Doppler study.  Left atrium is normal in size.  Right atrium is normal in size.  Left ventricle is normal in size and contractility with ejection fraction   of 60%.  Right ventricle is normal in size.  Atrial septum is intact.  Aorta is normal.  No pericardial effusion or intracardiac thrombus is seen.    Impression  Structurally and functionally normal cardiac valves.  Normal left ventricle size and contractility with ejection fraction of   60%.  Normal echo Doppler study.  Left ventricular ejection fraction is 60%.           Imaging Results (Most Recent)     Procedure Component Value Units Date/Time    CT Chest Pulmonary Embolism [[id number]] Collected:  10/27/19 1855     Updated:  10/27/19 2100    Narrative:       EXAMINATION: CT ANGIOGRAM CHEST WITH IV CONTRAST       DATE OF EXAMINATION:  October 27, 2019    INDICATION: Shortness of breath    COMPARISON: None available.    PROCEDURE: 100 ml of Isovue 370 contrast were administered intravenously during arterial phase axial CT chest imaging, with 2-D sagittal and coronal and 3-D MIP reformations.  CT dose lowering techniques were used, to include: automated exposure control,   adjustment for patient size, and or use of iterative reconstruction.    FINDINGS:    Cardiovascular: No pulmonary embolism is identified. Aorta and great vessels exhibit no aneurysm or dissection.    Mediastinum/Naima:  No mediastinal or hilar mass or significant lymphadenopathy identified.    Lungs/Pleura :  Moderate bibasilar consolidation with trace effusion.  Small infiltrates in the posterior portions of the upper lungs, as well.    Chest wall and Axilla: Normal.    Bones:  Unremarkable.    Upper abdomen: Vicarious excretion of contrast from the gallbladder.    Additional significant findings: None.        Impression:       1.  Moderate bilateral infiltrates and bibasilar areas of consolidation suggestive of pneumonia  and/or atelectasis.    Electronically signed by:  Vadim Godinez M.D.    10/27/2019 6:59 PM    XR Chest 1 View [[id number]] Collected:  10/27/19 1925     Updated:  10/27/19 1929    Narrative:       DATE OF EXAM:  10/27/2019 5:45 PM     PROCEDURE:  XR CHEST 1 VW-     INDICATIONS:  soa; R10.84-Generalized abdominal pain; R50.9-Fever, unspecified;  D72.829-Elevated white blood cell count, unspecified; K65.1-Peritoneal  abscess; D64.9-Anemia, unspecified; R10.9-Unspecified abdominal pain     COMPARISON:  CT abdomen and pelvis 10/26/2019.     TECHNIQUE:   Single radiographic AP view of the chest was obtained.     FINDINGS:  Study limited by lordotic patient positioning. Overlying artifacts. Low  lung volumes with bibasilar opacities. No pneumothorax.  Cardiomediastinal contours within normal limits. Moderate air distention  of the stomach. No acute osseous abnormality is identified.        Impression:       Limited study demonstrating low lung volumes with bibasilar opacities  that likely represent atelectasis. Superimposed pneumonia not entirely  excluded.     Electronically Signed By-Claudy Cardoza On:10/27/2019 7:27 PM  This report was finalized on [id number] by  Claudy Cardoza, .    CT Abdomen Pelvis With Contrast [[id number]] Collected:  10/26/19 0951     Updated:  10/26/19 0959    Narrative:       DATE OF EXAM:  10/26/2019 9:06 AM     PROCEDURE:  CT ABDOMEN PELVIS W CONTRAST-     INDICATIONS:   Abdominal pain, fever, leukocytosis.     COMPARISON:   04/17/2019     TECHNIQUE:  Routine transaxial slices were obtained through the abdomen and pelvis  after the intravenous administration of 100 mL of Isovue 370.  Reconstructed coronal and sagittal images were also obtained. Automated  exposure control and iterative construction methods were used.     FINDINGS:  There is abnormal thickening of the wall of the terminal ileum and  distal ileum in the right aspect of the pelvis consistent with a  nonspecific enteritis. This can  be seen with inflammatory bowel disease  or infection. There is a fluid collection in the presacral space on  image #115 measuring 5.4 x 2.8 cm which may represent an organizing  abscess. There is no well-defined rim enhancement. There is a  rim-enhancing fluid collection adjacent to the right common iliac artery  on image #97 measuring 2.2 x 1.4 cm which is suspicious for an abscess.  There is streaky density is seen throughout the intrapelvic fat  consistent with inflammatory changes. The patient has cystic areas in  both adnexa. It is possible that the patient could have bilateral  tubo-ovarian abscesses and there may be reactive thickening of the wall  of the distal ileum. I would recommend clinical correlation.  Alternatively, these may represent simple hemorrhagic and follicular  ovarian cysts. The patient has a fibroid uterus. There is a partially  calcified fibroid extending off the left lateral aspect of the posterior  wall of the fundus of uterus measuring 3.1 x 2.5 cm. There are surgical  clips adjacent to the cecum indicating a previous appendectomy. There  are some borderline enlarged lymph nodes in the retroperitoneum felt to  be secondary to reactive hyperplasia. The urinary bladder is  unremarkable. The liver, spleen, pancreas, adrenal glands, kidneys, and  gallbladder are normal. There is normal vascular enhancement. There is  mild bilateral basilar subsegmental atelectasis. There are no suspicious  osteolytic or sclerotic lesions in the bony structures.        Impression:          1. Abnormal thickening of the wall of the distal small bowel including  the terminal ileum. This can be seen with inflammatory bowel disease or  infectious enteritis.  2. There are cystic areas seen in both adnexa. I cannot completely  exclude bilateral tubo-ovarian abscesses. This could be causing reactive  thickening of the wall of the small bowel. I would recommend clinical  correlation.  3. There is suggestion of a  small abscess in the upper pelvis adjacent  to the right common iliac artery. There appears to be an organizing  fluid collection in the presacral space suspicious for a developing  abscess.  4. Uterine fibroids.  5. Inflammatory changes are seen throughout the intrapelvic fat.  6. Additional findings as noted above.     Electronically Signed By-Denis Malhotra On:10/26/2019 9:57 AM  This report was finalized on [id number] by  Denis Malhotra, .          Condition on Discharge:  good    Vital Signs  Temp:  [98.4 °F (36.9 °C)-98.6 °F (37 °C)] 98.6 °F (37 °C)  Heart Rate:  [85-97] 97  Resp:  [14-16] 16  BP: (100-106)/(67-69) 106/69    Physical Exam:     General Appearance:    Alert, cooperative, in no acute distress   Lungs:     Clear to auscultation,respirations regular, even and                  unlabored    Heart:    Regular rhythm and normal rate, normal S1 and S2, no            murmur, no gallop, no rub, no click   Abdomen:     Normal bowel sounds, no masses, no organomegaly, soft        non-tender, non-distended, no guarding, no rebound                tenderness   Extremities:   Moves all extremities well, no edema, no cyanosis, no             redness       Discharge Disposition  home    Discharge Medications     Discharge Medications      New Medications      Instructions Start Date   cefdinir 300 MG capsule  Commonly known as:  OMNICEF   300 mg, Oral, 2 Times Daily      HYDROcodone-acetaminophen 5-325 MG per tablet  Commonly known as:  NORCO   1 tablet, Oral, Every 4 Hours PRN         Continue These Medications      Instructions Start Date   PAXIL 40 MG tablet  Generic drug:  PARoxetine   40 mg, Oral, Every Morning             Discharge Diet: healthy heart      Activity at Discharge: no lifting more than 15 lbs      Follow-up Appointments F/U DR Taylor in 1 week  No future appointments.      Test Results Pending at Discharge       Risk for Readmission (LACE) Score: 8 (11/3/2019  6:00 AM)          Jhonatan Jung  MD Herbert  11/03/19  7:13 AM    Time:

## 2019-11-04 ENCOUNTER — READMISSION MANAGEMENT (OUTPATIENT)
Dept: CALL CENTER | Facility: HOSPITAL | Age: 44
End: 2019-11-04

## 2019-11-04 ENCOUNTER — TELEPHONE (OUTPATIENT)
Dept: SURGERY | Facility: CLINIC | Age: 44
End: 2019-11-04

## 2019-11-04 NOTE — OUTREACH NOTE
Prep Survey      Responses   Facility patient discharged from?  [facility]   Is patient eligible?  Yes   Discharge diagnosis  Generalized abdominal pain,  LAPAROTOMY EXPLORATORY  WITH RIGHT SALPINGECTOMY   Does the patient have one of the following disease processes/diagnoses(primary or secondary)?  General Surgery   Does the patient have Home health ordered?  No   Is there a DME ordered?  No   Prep survey completed?  Yes          Alis Daniels RN

## 2019-11-04 NOTE — PROGRESS NOTES
Case Management Discharge Note    Final Note: home         Final Discharge Disposition Code: 01 - home or self-care

## 2019-11-04 NOTE — TELEPHONE ENCOUNTER
Faxed work note to [employer] per patient request, advised her that they typically require a packet of paperwork, she will check with them.

## 2019-11-05 ENCOUNTER — READMISSION MANAGEMENT (OUTPATIENT)
Dept: CALL CENTER | Facility: HOSPITAL | Age: 44
End: 2019-11-05

## 2019-11-05 NOTE — OUTREACH NOTE
General Surgery Week 1 Survey      Responses   Facility patient discharged from?  [facility]   Does the patient have one of the following disease processes/diagnoses(primary or secondary)?  General Surgery   Is there a successful TCM telephone encounter documented?  No   Week 1 attempt successful?  No   Revoke  Decline to participate [No answer/mailbox full]          Aye Prado LPN

## 2019-11-11 ENCOUNTER — OFFICE VISIT (OUTPATIENT)
Dept: SURGERY | Facility: CLINIC | Age: 44
End: 2019-11-11

## 2019-11-11 VITALS
BODY MASS INDEX: 25.84 KG/M2 | SYSTOLIC BLOOD PRESSURE: 116 MMHG | DIASTOLIC BLOOD PRESSURE: 76 MMHG | OXYGEN SATURATION: 97 % | HEART RATE: 107 BPM | WEIGHT: 131.6 LBS | TEMPERATURE: 98.5 F | HEIGHT: 60 IN

## 2019-11-11 DIAGNOSIS — Z09 FOLLOW UP: Primary | ICD-10-CM

## 2019-11-11 PROCEDURE — 99024 POSTOP FOLLOW-UP VISIT: CPT | Performed by: SURGERY

## 2019-11-11 RX ORDER — OXYCODONE AND ACETAMINOPHEN 7.5; 325 MG/1; MG/1
1 TABLET ORAL EVERY 6 HOURS PRN
Qty: 20 TABLET | Refills: 0 | Status: SHIPPED | OUTPATIENT
Start: 2019-11-11

## 2019-11-11 NOTE — PROGRESS NOTES
"Subjective   Jolene Burnham is a 44 y.o. female.   This post exploratory laparotomy with washout of pelvic abscess and right salpingectomy.  Patient has no complaints except a little bit of pain for which she would like a few more Percocet.    Objective   /76   Pulse 107   Temp 98.5 °F (36.9 °C) (Oral)   Ht 152.4 cm (60\")   Wt 59.7 kg (131 lb 9.6 oz)   SpO2 97%   BMI 25.70 kg/m²   Physical Exam     Well-healing incision-staples removed    Assessment/Plan   Jolene was seen today for post-op follow-up.    Diagnoses and all orders for this visit:    Follow up    Other orders  -     oxyCODONE-acetaminophen (PERCOCET) 7.5-325 MG per tablet; Take 1 tablet by mouth Every 6 (Six) Hours As Needed for Severe Pain  for up to 20 doses.    That is post exploratory laparotomy, washout of pelvic abscess, and right salpingectomy.  Staples removed.  Patient surgically stable.  Follow-up PRN.    Jessica Taylor MD  11/11/2019  12:48 PM    "

## 2019-11-20 ENCOUNTER — TELEPHONE (OUTPATIENT)
Dept: SURGERY | Facility: CLINIC | Age: 44
End: 2019-11-20

## 2019-11-20 NOTE — TELEPHONE ENCOUNTER
"Patient requesting a refill of pain meds, something not as strong, but her incision is still \"burning\" and is very uncomfortable.   "

## 2019-11-20 NOTE — TELEPHONE ENCOUNTER
Vision is remote from surgery.  Does not need any more narcotics.  She can get these from her primary care provider if desired.

## 2019-11-21 NOTE — TELEPHONE ENCOUNTER
Spoke with patient, advised Tylenol and/or Ibuprofen for discomfort, and to notify us if worsening of pain or if her incision becomes red, warm or appears infected.

## 2020-07-28 ENCOUNTER — OFFICE (AMBULATORY)
Dept: URBAN - METROPOLITAN AREA CLINIC 64 | Facility: CLINIC | Age: 45
End: 2020-07-28

## 2020-07-28 VITALS — SYSTOLIC BLOOD PRESSURE: 103 MMHG | DIASTOLIC BLOOD PRESSURE: 75 MMHG | RESPIRATION RATE: 102 BRPM | WEIGHT: 130 LBS

## 2020-07-28 DIAGNOSIS — Z86.010 PERSONAL HISTORY OF COLONIC POLYPS: ICD-10-CM

## 2020-07-28 DIAGNOSIS — K58.9 IRRITABLE BOWEL SYNDROME WITHOUT DIARRHEA: ICD-10-CM

## 2020-07-28 PROCEDURE — 99213 OFFICE O/P EST LOW 20 MIN: CPT | Performed by: NURSE PRACTITIONER

## 2020-07-28 RX ORDER — PAROXETINE HYDROCHLORIDE 40 MG/1
TABLET, FILM COATED ORAL
Qty: 30 | Refills: 3 | Status: COMPLETED
End: 2023-10-24

## 2020-07-28 RX ORDER — DICYCLOMINE HYDROCHLORIDE 20 MG/1
80 TABLET ORAL
Qty: 120 | Refills: 12 | Status: ACTIVE
Start: 2020-07-28

## 2021-02-10 ENCOUNTER — OFFICE (AMBULATORY)
Dept: URBAN - METROPOLITAN AREA CLINIC 64 | Facility: CLINIC | Age: 46
End: 2021-02-10

## 2021-02-24 ENCOUNTER — OFFICE (AMBULATORY)
Dept: URBAN - METROPOLITAN AREA CLINIC 64 | Facility: CLINIC | Age: 46
End: 2021-02-24

## 2021-02-24 VITALS
SYSTOLIC BLOOD PRESSURE: 89 MMHG | HEIGHT: 60 IN | HEART RATE: 99 BPM | DIASTOLIC BLOOD PRESSURE: 60 MMHG | WEIGHT: 126 LBS

## 2021-02-24 DIAGNOSIS — K59.00 CONSTIPATION, UNSPECIFIED: ICD-10-CM

## 2021-02-24 DIAGNOSIS — K58.9 IRRITABLE BOWEL SYNDROME WITHOUT DIARRHEA: ICD-10-CM

## 2021-02-24 DIAGNOSIS — R11.0 NAUSEA: ICD-10-CM

## 2021-02-24 PROCEDURE — 99213 OFFICE O/P EST LOW 20 MIN: CPT | Performed by: NURSE PRACTITIONER

## 2021-02-24 RX ORDER — PAROXETINE HYDROCHLORIDE 40 MG/1
TABLET, FILM COATED ORAL
Qty: 30 | Refills: 3 | Status: COMPLETED
End: 2023-10-24

## 2021-02-24 RX ORDER — PROMETHAZINE HYDROCHLORIDE 25 MG/1
TABLET ORAL
Qty: 30 | Refills: 6 | Status: COMPLETED
Start: 2021-02-24 | End: 2022-08-17

## 2021-02-24 RX ORDER — LUBIPROSTONE 8 UG/1
16 CAPSULE, GELATIN COATED ORAL
Qty: 180 | Refills: 3 | Status: COMPLETED
Start: 2021-02-24 | End: 2021-12-01

## 2021-12-01 ENCOUNTER — OFFICE (AMBULATORY)
Dept: URBAN - METROPOLITAN AREA CLINIC 64 | Facility: CLINIC | Age: 46
End: 2021-12-01

## 2021-12-01 VITALS
HEART RATE: 75 BPM | SYSTOLIC BLOOD PRESSURE: 91 MMHG | HEIGHT: 60 IN | DIASTOLIC BLOOD PRESSURE: 58 MMHG | WEIGHT: 136 LBS

## 2021-12-01 DIAGNOSIS — K58.0 IRRITABLE BOWEL SYNDROME WITH DIARRHEA: ICD-10-CM

## 2021-12-01 DIAGNOSIS — K62.5 HEMORRHAGE OF ANUS AND RECTUM: ICD-10-CM

## 2021-12-01 DIAGNOSIS — R11.0 NAUSEA: ICD-10-CM

## 2021-12-01 PROCEDURE — 99214 OFFICE O/P EST MOD 30 MIN: CPT | Performed by: NURSE PRACTITIONER

## 2021-12-01 RX ORDER — PLECANATIDE 3 MG/1
3 TABLET ORAL
Qty: 90 | Refills: 3 | Status: COMPLETED
Start: 2021-12-01 | End: 2022-08-17

## 2022-08-17 ENCOUNTER — OFFICE (AMBULATORY)
Dept: URBAN - METROPOLITAN AREA CLINIC 64 | Facility: CLINIC | Age: 47
End: 2022-08-17

## 2022-08-17 VITALS
HEART RATE: 83 BPM | HEIGHT: 60 IN | SYSTOLIC BLOOD PRESSURE: 102 MMHG | WEIGHT: 128 LBS | DIASTOLIC BLOOD PRESSURE: 59 MMHG

## 2022-08-17 DIAGNOSIS — K59.00 CONSTIPATION, UNSPECIFIED: ICD-10-CM

## 2022-08-17 DIAGNOSIS — R19.7 DIARRHEA, UNSPECIFIED: ICD-10-CM

## 2022-08-17 PROCEDURE — 99214 OFFICE O/P EST MOD 30 MIN: CPT | Performed by: NURSE PRACTITIONER

## 2022-08-17 RX ORDER — HYOSCYAMINE SULFATE 0.38 MG/1
TABLET, EXTENDED RELEASE ORAL
Qty: 180 | Refills: 0 | Status: COMPLETED
End: 2023-10-24

## 2022-08-17 RX ORDER — PAROXETINE HYDROCHLORIDE 40 MG/1
TABLET, FILM COATED ORAL
Qty: 30 | Refills: 3 | Status: COMPLETED
End: 2023-10-24

## 2022-09-09 ENCOUNTER — ON CAMPUS - OUTPATIENT (AMBULATORY)
Dept: URBAN - METROPOLITAN AREA HOSPITAL 2 | Facility: HOSPITAL | Age: 47
End: 2022-09-09

## 2022-09-09 ENCOUNTER — OFFICE (AMBULATORY)
Dept: URBAN - METROPOLITAN AREA PATHOLOGY 4 | Facility: PATHOLOGY | Age: 47
End: 2022-09-09

## 2022-09-09 VITALS
SYSTOLIC BLOOD PRESSURE: 99 MMHG | RESPIRATION RATE: 18 BRPM | SYSTOLIC BLOOD PRESSURE: 131 MMHG | RESPIRATION RATE: 16 BRPM | SYSTOLIC BLOOD PRESSURE: 98 MMHG | HEART RATE: 68 BPM | HEART RATE: 66 BPM | OXYGEN SATURATION: 98 % | HEIGHT: 60 IN | DIASTOLIC BLOOD PRESSURE: 72 MMHG | HEART RATE: 67 BPM | DIASTOLIC BLOOD PRESSURE: 67 MMHG | SYSTOLIC BLOOD PRESSURE: 114 MMHG | HEART RATE: 76 BPM | DIASTOLIC BLOOD PRESSURE: 73 MMHG | HEART RATE: 70 BPM | SYSTOLIC BLOOD PRESSURE: 106 MMHG | RESPIRATION RATE: 14 BRPM | TEMPERATURE: 98 F | SYSTOLIC BLOOD PRESSURE: 89 MMHG | DIASTOLIC BLOOD PRESSURE: 49 MMHG | SYSTOLIC BLOOD PRESSURE: 115 MMHG | DIASTOLIC BLOOD PRESSURE: 56 MMHG | HEART RATE: 77 BPM | OXYGEN SATURATION: 100 % | HEART RATE: 64 BPM | HEART RATE: 105 BPM | SYSTOLIC BLOOD PRESSURE: 87 MMHG | DIASTOLIC BLOOD PRESSURE: 57 MMHG | WEIGHT: 125.8 LBS | SYSTOLIC BLOOD PRESSURE: 109 MMHG | DIASTOLIC BLOOD PRESSURE: 59 MMHG | DIASTOLIC BLOOD PRESSURE: 51 MMHG | OXYGEN SATURATION: 99 % | RESPIRATION RATE: 17 BRPM | DIASTOLIC BLOOD PRESSURE: 42 MMHG

## 2022-09-09 DIAGNOSIS — R11.0 NAUSEA: ICD-10-CM

## 2022-09-09 DIAGNOSIS — K25.3 ACUTE GASTRIC ULCER WITHOUT HEMORRHAGE OR PERFORATION: ICD-10-CM

## 2022-09-09 DIAGNOSIS — R19.4 CHANGE IN BOWEL HABIT: ICD-10-CM

## 2022-09-09 DIAGNOSIS — R63.4 ABNORMAL WEIGHT LOSS: ICD-10-CM

## 2022-09-09 DIAGNOSIS — K29.50 UNSPECIFIED CHRONIC GASTRITIS WITHOUT BLEEDING: ICD-10-CM

## 2022-09-09 LAB
GI HISTOLOGY: A. UNSPECIFIED: (no result)
GI HISTOLOGY: B. SELECT: (no result)
GI HISTOLOGY: C. UNSPECIFIED: (no result)
GI HISTOLOGY: PDF REPORT: (no result)

## 2022-09-09 PROCEDURE — 43239 EGD BIOPSY SINGLE/MULTIPLE: CPT | Performed by: INTERNAL MEDICINE

## 2022-09-09 PROCEDURE — 45380 COLONOSCOPY AND BIOPSY: CPT | Performed by: INTERNAL MEDICINE

## 2022-09-09 PROCEDURE — 88305 TISSUE EXAM BY PATHOLOGIST: CPT | Performed by: INTERNAL MEDICINE

## 2022-09-09 PROCEDURE — 88342 IMHCHEM/IMCYTCHM 1ST ANTB: CPT | Performed by: INTERNAL MEDICINE

## 2022-09-09 RX ORDER — ESOMEPRAZOLE MAGNESIUM 40 MG/1
40 CAPSULE, DELAYED RELEASE ORAL
Qty: 90 | Refills: 3 | Status: ACTIVE
Start: 2022-09-09

## 2023-10-24 ENCOUNTER — OFFICE (AMBULATORY)
Dept: URBAN - METROPOLITAN AREA CLINIC 64 | Facility: CLINIC | Age: 48
End: 2023-10-24

## 2023-10-24 VITALS
HEIGHT: 60 IN | DIASTOLIC BLOOD PRESSURE: 68 MMHG | HEART RATE: 97 BPM | SYSTOLIC BLOOD PRESSURE: 111 MMHG | WEIGHT: 131 LBS

## 2023-10-24 DIAGNOSIS — K42.9 UMBILICAL HERNIA WITHOUT OBSTRUCTION OR GANGRENE: ICD-10-CM

## 2023-10-24 DIAGNOSIS — R11.0 NAUSEA: ICD-10-CM

## 2023-10-24 DIAGNOSIS — K58.9 IRRITABLE BOWEL SYNDROME WITHOUT DIARRHEA: ICD-10-CM

## 2023-10-24 DIAGNOSIS — R19.4 CHANGE IN BOWEL HABIT: ICD-10-CM

## 2023-10-24 DIAGNOSIS — R10.84 GENERALIZED ABDOMINAL PAIN: ICD-10-CM

## 2023-10-24 PROCEDURE — 99214 OFFICE O/P EST MOD 30 MIN: CPT | Performed by: NURSE PRACTITIONER

## 2023-10-24 RX ORDER — PROMETHAZINE HYDROCHLORIDE 25 MG/1
TABLET ORAL
Qty: 60 | Refills: 6 | Status: ACTIVE
Start: 2023-04-18

## 2023-10-24 RX ORDER — ESOMEPRAZOLE MAGNESIUM 40 MG/1
40 CAPSULE, DELAYED RELEASE ORAL
Qty: 90 | Refills: 3 | Status: ACTIVE
Start: 2022-09-09

## 2023-10-24 RX ORDER — DICYCLOMINE HYDROCHLORIDE 20 MG/1
80 TABLET ORAL
Qty: 120 | Refills: 12 | Status: ACTIVE
Start: 2020-07-28

## 2023-10-24 RX ORDER — PAROXETINE HYDROCHLORIDE 40 MG/1
TABLET, FILM COATED ORAL
Qty: 90 | Refills: 3 | Status: ACTIVE

## 2023-11-01 ENCOUNTER — OFFICE (AMBULATORY)
Dept: URBAN - METROPOLITAN AREA CLINIC 64 | Facility: CLINIC | Age: 48
End: 2023-11-01

## 2023-11-01 DIAGNOSIS — K42.9 UMBILICAL HERNIA WITHOUT OBSTRUCTION OR GANGRENE: ICD-10-CM

## 2023-11-03 NOTE — H&P (VIEW-ONLY)
General Surgery History and Physical      Referring Provider: YOUSUF Mast    Chief Complaint:    Incisional hernia    History of Present Illness:    Jolene Burnham is a 48 y.o. female with a prior history of a laparotomy who presents for evaluation of an incisional hernia.  She reports she started noticing swelling in the area in 2023.  She reports is reducible.  Over the last 1 and half months she has had pain related to the hernia.  She has a history of IBS with constipation and reports significant discomfort when she strains.  Sometimes some associated nausea she has not vomited.  She is interested in repair due to discomfort.    Past Medical History:   Past Medical History:   Diagnosis Date    Anemia     Breast mass     Colitis     Colon polyp     Fibrocystic breast     Irritable bowel syndrome     PONV (postoperative nausea and vomiting)     Sjogren's syndrome     Substance abuse     pills/ clean for 10 years      Past Surgical History:    Past Surgical History:   Procedure Laterality Date    APPENDECTOMY  2016    BREAST BIOPSY      Right side    BREAST SURGERY  1992     SECTION      EXPLORATORY LAPAROTOMY N/A 10/26/2019    Procedure: LAPAROTOMY EXPLORATORY  WITH RIGHT SALPINGECTOMY;  Surgeon: Jessica Taylor MD;  Location: [facility] OR;  Service: General     Family History:    Family History   Problem Relation Age of Onset    Hearing loss Mother     Miscarriages / Stillbirths Mother     Cancer Maternal Grandfather     Alcohol abuse Paternal Uncle     Diabetes Sister        Social History:    Social History     Socioeconomic History    Marital status:    Tobacco Use    Smoking status: Every Day     Packs/day: 0.50     Years: 20.00     Additional pack years: 0.00     Total pack years: 10.00     Types: Cigarettes     Passive exposure: Current   Vaping Use    Vaping Use: Never used   Substance and Sexual Activity    Alcohol use: No    Drug use: Yes     Types:  Hydrocodone    Sexual activity: Not Currently     Partners: Male     Birth control/protection: None     Allergies:   Allergies   Allergen Reactions    Penicillins Hives     Medications:     Current Outpatient Medications:     aspirin-acetaminophen-caffeine (EXCEDRIN MIGRAINE) 250-250-65 MG per tablet, Take 1 tablet by mouth Every 6 (Six) Hours As Needed for Headache., Disp: , Rfl:     dicyclomine (BENTYL) 20 MG tablet, Take 1 tablet by mouth Every 6 (Six) Hours As Needed for Abdominal Cramping. for pain, Disp: , Rfl:     PARoxetine (PAXIL) 40 MG tablet, Take 1 tablet by mouth Every Morning., Disp: , Rfl:     promethazine (PHENERGAN) 25 MG tablet, Take 1 tablet by mouth Every 6 (Six) Hours As Needed for Nausea., Disp: , Rfl:     esomeprazole (nexIUM) 40 MG capsule, Take 1 capsule by mouth Every Morning Before Breakfast. (Patient not taking: Reported on 11/6/2023), Disp: , Rfl:     Radiology/Endoscopy:    None applicable    Labs:    None recent within our system    Review of Systems:   As noted above in HPI.  History of IBS with diarrhea and constipation.    Physical Exam:   No acute distress, alert  Nonlabored respirations  Abdomen soft, nontender, nondistended, reducible hernia in the area of the umbilicus approximately 3 cm x 3 cm  Extremities warm well perfused with no gross deformities    Assessment and Plan:  Jolene Burnham is a 48 y.o. female with a symptomatic incisional hernia.    - Discussed surgery along with associate risk, benefits, alternatives; risks discussed included but were not limited to bleeding, infection, hernia recurrence, conversion to open, possible bowel injury  - Patient expressed understanding given she is symptomatic she would like to proceed with surgery  - Patient will be scheduled for robotic assisted laparoscopic incisional hernia repair with mesh, possible open    Joya Rubio MD  General Surgery

## 2023-11-03 NOTE — PROGRESS NOTES
General Surgery History and Physical      Referring Provider: YOUSUF Mast    Chief Complaint:    Incisional hernia    History of Present Illness:    Jolene Burnham is a 48 y.o. female with a prior history of a laparotomy who presents for evaluation of an incisional hernia.  She reports she started noticing swelling in the area in 2023.  She reports is reducible.  Over the last 1 and half months she has had pain related to the hernia.  She has a history of IBS with constipation and reports significant discomfort when she strains.  Sometimes some associated nausea she has not vomited.  She is interested in repair due to discomfort.    Past Medical History:   Past Medical History:   Diagnosis Date    Anemia     Breast mass     Colitis     Colon polyp     Fibrocystic breast     Irritable bowel syndrome     PONV (postoperative nausea and vomiting)     Sjogren's syndrome     Substance abuse     pills/ clean for 10 years      Past Surgical History:    Past Surgical History:   Procedure Laterality Date    APPENDECTOMY  2016    BREAST BIOPSY      Right side    BREAST SURGERY  1992     SECTION      EXPLORATORY LAPAROTOMY N/A 10/26/2019    Procedure: LAPAROTOMY EXPLORATORY  WITH RIGHT SALPINGECTOMY;  Surgeon: Jessica Taylor MD;  Location: [facility] OR;  Service: General     Family History:    Family History   Problem Relation Age of Onset    Hearing loss Mother     Miscarriages / Stillbirths Mother     Cancer Maternal Grandfather     Alcohol abuse Paternal Uncle     Diabetes Sister        Social History:    Social History     Socioeconomic History    Marital status:    Tobacco Use    Smoking status: Every Day     Packs/day: 0.50     Years: 20.00     Additional pack years: 0.00     Total pack years: 10.00     Types: Cigarettes     Passive exposure: Current   Vaping Use    Vaping Use: Never used   Substance and Sexual Activity    Alcohol use: No    Drug use: Yes     Types:  Hydrocodone    Sexual activity: Not Currently     Partners: Male     Birth control/protection: None     Allergies:   Allergies   Allergen Reactions    Penicillins Hives     Medications:     Current Outpatient Medications:     aspirin-acetaminophen-caffeine (EXCEDRIN MIGRAINE) 250-250-65 MG per tablet, Take 1 tablet by mouth Every 6 (Six) Hours As Needed for Headache., Disp: , Rfl:     dicyclomine (BENTYL) 20 MG tablet, Take 1 tablet by mouth Every 6 (Six) Hours As Needed for Abdominal Cramping. for pain, Disp: , Rfl:     PARoxetine (PAXIL) 40 MG tablet, Take 1 tablet by mouth Every Morning., Disp: , Rfl:     promethazine (PHENERGAN) 25 MG tablet, Take 1 tablet by mouth Every 6 (Six) Hours As Needed for Nausea., Disp: , Rfl:     esomeprazole (nexIUM) 40 MG capsule, Take 1 capsule by mouth Every Morning Before Breakfast. (Patient not taking: Reported on 11/6/2023), Disp: , Rfl:     Radiology/Endoscopy:    None applicable    Labs:    None recent within our system    Review of Systems:   As noted above in HPI.  History of IBS with diarrhea and constipation.    Physical Exam:   No acute distress, alert  Nonlabored respirations  Abdomen soft, nontender, nondistended, reducible hernia in the area of the umbilicus approximately 3 cm x 3 cm  Extremities warm well perfused with no gross deformities    Assessment and Plan:  Jolene Burnham is a 48 y.o. female with a symptomatic incisional hernia.    - Discussed surgery along with associate risk, benefits, alternatives; risks discussed included but were not limited to bleeding, infection, hernia recurrence, conversion to open, possible bowel injury  - Patient expressed understanding given she is symptomatic she would like to proceed with surgery  - Patient will be scheduled for robotic assisted laparoscopic incisional hernia repair with mesh, possible open    Joya Rubio MD  General Surgery

## 2023-11-06 ENCOUNTER — OFFICE VISIT (OUTPATIENT)
Dept: SURGERY | Facility: CLINIC | Age: 48
End: 2023-11-06
Payer: COMMERCIAL

## 2023-11-06 VITALS
HEIGHT: 60 IN | WEIGHT: 130.8 LBS | OXYGEN SATURATION: 98 % | SYSTOLIC BLOOD PRESSURE: 118 MMHG | BODY MASS INDEX: 25.68 KG/M2 | DIASTOLIC BLOOD PRESSURE: 81 MMHG | HEART RATE: 79 BPM | TEMPERATURE: 98.4 F

## 2023-11-06 DIAGNOSIS — K43.2 INCISIONAL HERNIA, WITHOUT OBSTRUCTION OR GANGRENE: Primary | ICD-10-CM

## 2023-11-06 PROCEDURE — 99204 OFFICE O/P NEW MOD 45 MIN: CPT | Performed by: SURGERY

## 2023-11-06 RX ORDER — DICYCLOMINE HCL 20 MG
20 TABLET ORAL EVERY 6 HOURS PRN
COMMUNITY
Start: 2023-10-24

## 2023-11-06 RX ORDER — SODIUM CHLORIDE 9 MG/ML
40 INJECTION, SOLUTION INTRAVENOUS AS NEEDED
OUTPATIENT
Start: 2023-11-06

## 2023-11-06 RX ORDER — SODIUM CHLORIDE 0.9 % (FLUSH) 0.9 %
3-10 SYRINGE (ML) INJECTION AS NEEDED
OUTPATIENT
Start: 2023-11-06

## 2023-11-06 RX ORDER — SODIUM CHLORIDE 9 MG/ML
100 INJECTION, SOLUTION INTRAVENOUS CONTINUOUS
OUTPATIENT
Start: 2023-11-06

## 2023-11-06 RX ORDER — PROMETHAZINE HYDROCHLORIDE 25 MG/1
25 TABLET ORAL EVERY 6 HOURS PRN
COMMUNITY
Start: 2023-10-24

## 2023-11-06 RX ORDER — ESOMEPRAZOLE MAGNESIUM 40 MG/1
40 CAPSULE, DELAYED RELEASE ORAL
COMMUNITY
Start: 2023-10-24

## 2023-11-06 RX ORDER — SODIUM CHLORIDE 0.9 % (FLUSH) 0.9 %
3 SYRINGE (ML) INJECTION EVERY 12 HOURS SCHEDULED
OUTPATIENT
Start: 2023-11-06

## 2023-11-06 NOTE — LETTER
November 6, 2023     Patient: Jolene Burnham   YOB: 1975   Date of Visit: 11/6/2023       To Whom it May Concern:    Jolene Burnham was seen in my clinic on 11/6/2023. She may return to work on 11/6/2023 .    If you have any questions or concerns, please don't hesitate to call.         Sincerely,          Joya Rubio MD        CC: Jolene Burnham

## 2023-11-13 ENCOUNTER — PATIENT ROUNDING (BHMG ONLY) (OUTPATIENT)
Dept: SURGERY | Facility: CLINIC | Age: 48
End: 2023-11-13
Payer: COMMERCIAL

## 2023-11-13 NOTE — PROGRESS NOTES
November 13, 2023    Hello, may I speak with Jolene Burnham?    My name is Jahaira       I am  with [facility]  [address] [place] [address].    Before we get started may I verify your date of birth? 1975    I am calling to officially welcome you to our practice and ask about your recent visit. Is this a good time to talk? yes    Tell me about your visit with us. What things went well?  patient stated that everything was fine with visit       We're always looking for ways to make our patients' experiences even better. Do you have recommendations on ways we may improve?  no    Overall were you satisfied with your first visit to our practice? yes       I appreciate you taking the time to speak with me today. Is there anything else I can do for you? no      Thank you, and have a great day.

## 2023-11-15 ENCOUNTER — TELEPHONE (OUTPATIENT)
Dept: SURGERY | Facility: CLINIC | Age: 48
End: 2023-11-15
Payer: COMMERCIAL

## 2023-11-15 NOTE — TELEPHONE ENCOUNTER
FMLA paperwork received from patient's employer. Call placed to patient, left message on machine advising we received the paperwork. Advised we would not fill out until the day after surgery to ensure the surgery does not get pushed out or cancelled for any reason. Advised we do need her to come by the office to sign the release form and that there is a $25 form fee that would need to be paid as well. Advised to call the office with any other questions.

## 2023-11-21 ENCOUNTER — LAB (OUTPATIENT)
Dept: LAB | Facility: HOSPITAL | Age: 48
End: 2023-11-21
Payer: COMMERCIAL

## 2023-11-21 LAB
DEPRECATED RDW RBC AUTO: 47.7 FL (ref 37–54)
ERYTHROCYTE [DISTWIDTH] IN BLOOD BY AUTOMATED COUNT: 16.3 % (ref 12.3–15.4)
HCT VFR BLD AUTO: 31.6 % (ref 34–46.6)
HGB BLD-MCNC: 10.1 G/DL (ref 12–15.9)
MCH RBC QN AUTO: 25.8 PG (ref 26.6–33)
MCHC RBC AUTO-ENTMCNC: 32 G/DL (ref 31.5–35.7)
MCV RBC AUTO: 80.6 FL (ref 79–97)
PLATELET # BLD AUTO: 265 10*3/MM3 (ref 140–450)
PMV BLD AUTO: 10.7 FL (ref 6–12)
RBC # BLD AUTO: 3.92 10*6/MM3 (ref 3.77–5.28)
WBC NRBC COR # BLD AUTO: 5.38 10*3/MM3 (ref 3.4–10.8)

## 2023-11-21 PROCEDURE — 85027 COMPLETE CBC AUTOMATED: CPT

## 2023-11-30 ENCOUNTER — HOSPITAL ENCOUNTER (OUTPATIENT)
Facility: HOSPITAL | Age: 48
Setting detail: HOSPITAL OUTPATIENT SURGERY
Discharge: HOME | End: 2023-11-30
Attending: SURGERY | Admitting: SURGERY
Payer: COMMERCIAL

## 2023-11-30 ENCOUNTER — ANESTHESIA (OUTPATIENT)
Dept: PERIOP | Facility: HOSPITAL | Age: 48
End: 2023-11-30
Payer: COMMERCIAL

## 2023-11-30 ENCOUNTER — ANESTHESIA EVENT (OUTPATIENT)
Dept: PERIOP | Facility: HOSPITAL | Age: 48
End: 2023-11-30
Payer: COMMERCIAL

## 2023-11-30 VITALS
BODY MASS INDEX: 25.25 KG/M2 | SYSTOLIC BLOOD PRESSURE: 109 MMHG | WEIGHT: 128.6 LBS | TEMPERATURE: 98.4 F | HEIGHT: 60 IN | HEART RATE: 78 BPM | OXYGEN SATURATION: 98 % | RESPIRATION RATE: 10 BRPM | DIASTOLIC BLOOD PRESSURE: 73 MMHG

## 2023-11-30 DIAGNOSIS — K43.2 INCISIONAL HERNIA, WITHOUT OBSTRUCTION OR GANGRENE: ICD-10-CM

## 2023-11-30 LAB — B-HCG UR QL: NEGATIVE

## 2023-11-30 PROCEDURE — 25810000003 LACTATED RINGERS PER 1000 ML: Performed by: ANESTHESIOLOGY

## 2023-11-30 PROCEDURE — 49593 RPR AA HRN 1ST 3-10 RDC: CPT | Performed by: SURGERY

## 2023-11-30 PROCEDURE — C1781 MESH (IMPLANTABLE): HCPCS | Performed by: SURGERY

## 2023-11-30 PROCEDURE — 25010000002 DEXAMETHASONE PER 1 MG: Performed by: ANESTHESIOLOGIST ASSISTANT

## 2023-11-30 PROCEDURE — 25010000002 CEFAZOLIN PER 500 MG: Performed by: SURGERY

## 2023-11-30 PROCEDURE — 25010000002 MEPERIDINE PER 100 MG: Performed by: ANESTHESIOLOGIST ASSISTANT

## 2023-11-30 PROCEDURE — 25010000002 BUPIVACAINE 0.25 % SOLUTION: Performed by: SURGERY

## 2023-11-30 PROCEDURE — 81025 URINE PREGNANCY TEST: CPT | Performed by: ANESTHESIOLOGY

## 2023-11-30 PROCEDURE — 25010000002 FENTANYL CITRATE (PF) 100 MCG/2ML SOLUTION: Performed by: ANESTHESIOLOGIST ASSISTANT

## 2023-11-30 PROCEDURE — 25010000002 ONDANSETRON PER 1 MG: Performed by: ANESTHESIOLOGIST ASSISTANT

## 2023-11-30 PROCEDURE — 25010000002 PROPOFOL 200 MG/20ML EMULSION: Performed by: ANESTHESIOLOGIST ASSISTANT

## 2023-11-30 PROCEDURE — 25010000002 SUGAMMADEX 200 MG/2ML SOLUTION: Performed by: ANESTHESIOLOGIST ASSISTANT

## 2023-11-30 PROCEDURE — 25010000002 HYDROMORPHONE 1 MG/ML SOLUTION: Performed by: ANESTHESIOLOGIST ASSISTANT

## 2023-11-30 PROCEDURE — 25010000002 LIDOCAINE 1 % SOLUTION: Performed by: ANESTHESIOLOGY

## 2023-11-30 DEVICE — DEV WND/CLS STRATAFIX SYMM PDS PLS ABS 23CM 9IN VIL: Type: IMPLANTABLE DEVICE | Site: ABDOMEN | Status: FUNCTIONAL

## 2023-11-30 DEVICE — ABSORBABLE WOUND CLOSURE DEVICE
Type: IMPLANTABLE DEVICE | Site: ABDOMEN | Status: FUNCTIONAL
Brand: V-LOC 180

## 2023-11-30 DEVICE — VENTRALIGHT ST MESH
Type: IMPLANTABLE DEVICE | Site: ABDOMEN | Status: FUNCTIONAL
Brand: VENTRALIGHT ST

## 2023-11-30 RX ORDER — ROCURONIUM BROMIDE 10 MG/ML
INJECTION, SOLUTION INTRAVENOUS AS NEEDED
Status: DISCONTINUED | OUTPATIENT
Start: 2023-11-30 | End: 2023-11-30 | Stop reason: SURG

## 2023-11-30 RX ORDER — DIPHENHYDRAMINE HYDROCHLORIDE 50 MG/ML
12.5 INJECTION INTRAMUSCULAR; INTRAVENOUS ONCE AS NEEDED
Status: DISCONTINUED | OUTPATIENT
Start: 2023-11-30 | End: 2023-11-30 | Stop reason: HOSPADM

## 2023-11-30 RX ORDER — DIPHENHYDRAMINE HYDROCHLORIDE 50 MG/ML
12.5 INJECTION INTRAMUSCULAR; INTRAVENOUS
Status: DISCONTINUED | OUTPATIENT
Start: 2023-11-30 | End: 2023-11-30 | Stop reason: HOSPADM

## 2023-11-30 RX ORDER — DEXAMETHASONE SODIUM PHOSPHATE 4 MG/ML
INJECTION, SOLUTION INTRA-ARTICULAR; INTRALESIONAL; INTRAMUSCULAR; INTRAVENOUS; SOFT TISSUE AS NEEDED
Status: DISCONTINUED | OUTPATIENT
Start: 2023-11-30 | End: 2023-11-30 | Stop reason: SURG

## 2023-11-30 RX ORDER — PHENYLEPHRINE HCL IN 0.9% NACL 1 MG/10 ML
SYRINGE (ML) INTRAVENOUS AS NEEDED
Status: DISCONTINUED | OUTPATIENT
Start: 2023-11-30 | End: 2023-11-30 | Stop reason: SURG

## 2023-11-30 RX ORDER — IPRATROPIUM BROMIDE AND ALBUTEROL SULFATE 2.5; .5 MG/3ML; MG/3ML
3 SOLUTION RESPIRATORY (INHALATION) ONCE AS NEEDED
Status: DISCONTINUED | OUTPATIENT
Start: 2023-11-30 | End: 2023-11-30 | Stop reason: HOSPADM

## 2023-11-30 RX ORDER — FENTANYL CITRATE 50 UG/ML
INJECTION, SOLUTION INTRAMUSCULAR; INTRAVENOUS AS NEEDED
Status: DISCONTINUED | OUTPATIENT
Start: 2023-11-30 | End: 2023-11-30 | Stop reason: SURG

## 2023-11-30 RX ORDER — HYDRALAZINE HYDROCHLORIDE 20 MG/ML
5 INJECTION INTRAMUSCULAR; INTRAVENOUS
Status: DISCONTINUED | OUTPATIENT
Start: 2023-11-30 | End: 2023-11-30 | Stop reason: HOSPADM

## 2023-11-30 RX ORDER — MEPERIDINE HYDROCHLORIDE 25 MG/ML
12.5 INJECTION INTRAMUSCULAR; INTRAVENOUS; SUBCUTANEOUS
Status: COMPLETED | OUTPATIENT
Start: 2023-11-30 | End: 2023-11-30

## 2023-11-30 RX ORDER — SODIUM CHLORIDE 9 MG/ML
100 INJECTION, SOLUTION INTRAVENOUS CONTINUOUS
Status: DISCONTINUED | OUTPATIENT
Start: 2023-11-30 | End: 2023-11-30 | Stop reason: HOSPADM

## 2023-11-30 RX ORDER — LABETALOL HYDROCHLORIDE 5 MG/ML
5 INJECTION, SOLUTION INTRAVENOUS
Status: DISCONTINUED | OUTPATIENT
Start: 2023-11-30 | End: 2023-11-30 | Stop reason: HOSPADM

## 2023-11-30 RX ORDER — OXYCODONE HYDROCHLORIDE 5 MG/1
10 TABLET ORAL EVERY 4 HOURS PRN
Status: DISCONTINUED | OUTPATIENT
Start: 2023-11-30 | End: 2023-11-30 | Stop reason: HOSPADM

## 2023-11-30 RX ORDER — ONDANSETRON 2 MG/ML
4 INJECTION INTRAMUSCULAR; INTRAVENOUS ONCE AS NEEDED
Status: DISCONTINUED | OUTPATIENT
Start: 2023-11-30 | End: 2023-11-30 | Stop reason: HOSPADM

## 2023-11-30 RX ORDER — LIDOCAINE HYDROCHLORIDE 10 MG/ML
0.5 INJECTION, SOLUTION INFILTRATION; PERINEURAL ONCE AS NEEDED
Status: COMPLETED | OUTPATIENT
Start: 2023-11-30 | End: 2023-11-30

## 2023-11-30 RX ORDER — LORAZEPAM 2 MG/ML
1 INJECTION INTRAMUSCULAR
Status: DISCONTINUED | OUTPATIENT
Start: 2023-11-30 | End: 2023-11-30 | Stop reason: HOSPADM

## 2023-11-30 RX ORDER — PROMETHAZINE HYDROCHLORIDE 25 MG/1
25 SUPPOSITORY RECTAL ONCE AS NEEDED
Status: DISCONTINUED | OUTPATIENT
Start: 2023-11-30 | End: 2023-11-30 | Stop reason: HOSPADM

## 2023-11-30 RX ORDER — FLUMAZENIL 0.1 MG/ML
0.5 INJECTION INTRAVENOUS AS NEEDED
Status: DISCONTINUED | OUTPATIENT
Start: 2023-11-30 | End: 2023-11-30 | Stop reason: HOSPADM

## 2023-11-30 RX ORDER — DIPHENHYDRAMINE HCL 25 MG
25 CAPSULE ORAL
Status: DISCONTINUED | OUTPATIENT
Start: 2023-11-30 | End: 2023-11-30 | Stop reason: HOSPADM

## 2023-11-30 RX ORDER — SODIUM CHLORIDE 0.9 % (FLUSH) 0.9 %
10 SYRINGE (ML) INJECTION AS NEEDED
Status: DISCONTINUED | OUTPATIENT
Start: 2023-11-30 | End: 2023-11-30 | Stop reason: HOSPADM

## 2023-11-30 RX ORDER — HYDROCODONE BITARTRATE AND ACETAMINOPHEN 5; 325 MG/1; MG/1
1 TABLET ORAL EVERY 6 HOURS PRN
Qty: 20 TABLET | Refills: 0 | Status: SHIPPED | OUTPATIENT
Start: 2023-11-30 | End: 2023-12-05

## 2023-11-30 RX ORDER — SODIUM CHLORIDE 0.9 % (FLUSH) 0.9 %
3 SYRINGE (ML) INJECTION EVERY 12 HOURS SCHEDULED
Status: DISCONTINUED | OUTPATIENT
Start: 2023-11-30 | End: 2023-11-30 | Stop reason: HOSPADM

## 2023-11-30 RX ORDER — SODIUM CHLORIDE, SODIUM LACTATE, POTASSIUM CHLORIDE, CALCIUM CHLORIDE 600; 310; 30; 20 MG/100ML; MG/100ML; MG/100ML; MG/100ML
20 INJECTION, SOLUTION INTRAVENOUS CONTINUOUS
Status: DISCONTINUED | OUTPATIENT
Start: 2023-11-30 | End: 2023-11-30 | Stop reason: HOSPADM

## 2023-11-30 RX ORDER — BUPIVACAINE HYDROCHLORIDE 2.5 MG/ML
INJECTION, SOLUTION INFILTRATION; PERINEURAL AS NEEDED
Status: DISCONTINUED | OUTPATIENT
Start: 2023-11-30 | End: 2023-11-30 | Stop reason: HOSPADM

## 2023-11-30 RX ORDER — PROPOFOL 10 MG/ML
INJECTION, EMULSION INTRAVENOUS AS NEEDED
Status: DISCONTINUED | OUTPATIENT
Start: 2023-11-30 | End: 2023-11-30 | Stop reason: SURG

## 2023-11-30 RX ORDER — ONDANSETRON 2 MG/ML
INJECTION INTRAMUSCULAR; INTRAVENOUS AS NEEDED
Status: DISCONTINUED | OUTPATIENT
Start: 2023-11-30 | End: 2023-11-30 | Stop reason: SURG

## 2023-11-30 RX ORDER — MIDAZOLAM HYDROCHLORIDE 1 MG/ML
2 INJECTION INTRAMUSCULAR; INTRAVENOUS
Status: DISCONTINUED | OUTPATIENT
Start: 2023-11-30 | End: 2023-11-30 | Stop reason: HOSPADM

## 2023-11-30 RX ORDER — OXYCODONE HYDROCHLORIDE 5 MG/1
7.5 TABLET ORAL EVERY 4 HOURS PRN
Status: DISCONTINUED | OUTPATIENT
Start: 2023-11-30 | End: 2023-11-30 | Stop reason: HOSPADM

## 2023-11-30 RX ORDER — EPHEDRINE SULFATE 5 MG/ML
5 INJECTION INTRAVENOUS ONCE AS NEEDED
Status: DISCONTINUED | OUTPATIENT
Start: 2023-11-30 | End: 2023-11-30 | Stop reason: HOSPADM

## 2023-11-30 RX ORDER — IBUPROFEN 400 MG/1
600 TABLET ORAL ONCE AS NEEDED
Status: DISCONTINUED | OUTPATIENT
Start: 2023-11-30 | End: 2023-11-30 | Stop reason: HOSPADM

## 2023-11-30 RX ORDER — SODIUM CHLORIDE 9 MG/ML
40 INJECTION, SOLUTION INTRAVENOUS AS NEEDED
Status: DISCONTINUED | OUTPATIENT
Start: 2023-11-30 | End: 2023-11-30 | Stop reason: HOSPADM

## 2023-11-30 RX ORDER — FENTANYL CITRATE 50 UG/ML
50 INJECTION, SOLUTION INTRAMUSCULAR; INTRAVENOUS
Status: DISCONTINUED | OUTPATIENT
Start: 2023-11-30 | End: 2023-11-30 | Stop reason: HOSPADM

## 2023-11-30 RX ORDER — FENTANYL CITRATE 50 UG/ML
100 INJECTION, SOLUTION INTRAMUSCULAR; INTRAVENOUS
Status: DISCONTINUED | OUTPATIENT
Start: 2023-11-30 | End: 2023-11-30 | Stop reason: HOSPADM

## 2023-11-30 RX ORDER — SODIUM CHLORIDE 0.9 % (FLUSH) 0.9 %
3-10 SYRINGE (ML) INJECTION AS NEEDED
Status: DISCONTINUED | OUTPATIENT
Start: 2023-11-30 | End: 2023-11-30 | Stop reason: HOSPADM

## 2023-11-30 RX ORDER — PROMETHAZINE HYDROCHLORIDE 25 MG/1
25 TABLET ORAL ONCE AS NEEDED
Status: DISCONTINUED | OUTPATIENT
Start: 2023-11-30 | End: 2023-11-30 | Stop reason: HOSPADM

## 2023-11-30 RX ORDER — LIDOCAINE HYDROCHLORIDE 20 MG/ML
INJECTION, SOLUTION EPIDURAL; INFILTRATION; INTRACAUDAL; PERINEURAL AS NEEDED
Status: DISCONTINUED | OUTPATIENT
Start: 2023-11-30 | End: 2023-11-30 | Stop reason: SURG

## 2023-11-30 RX ORDER — GLYCOPYRROLATE 0.2 MG/ML
INJECTION INTRAMUSCULAR; INTRAVENOUS AS NEEDED
Status: DISCONTINUED | OUTPATIENT
Start: 2023-11-30 | End: 2023-11-30 | Stop reason: SURG

## 2023-11-30 RX ORDER — NALOXONE HCL 0.4 MG/ML
0.4 VIAL (ML) INJECTION AS NEEDED
Status: DISCONTINUED | OUTPATIENT
Start: 2023-11-30 | End: 2023-11-30 | Stop reason: HOSPADM

## 2023-11-30 RX ADMIN — GLYCOPYRROLATE 0.2 MG: 0.2 INJECTION INTRAMUSCULAR; INTRAVENOUS at 12:06

## 2023-11-30 RX ADMIN — DEXAMETHASONE SODIUM PHOSPHATE 8 MG: 4 INJECTION, SOLUTION INTRAMUSCULAR; INTRAVENOUS at 10:43

## 2023-11-30 RX ADMIN — ROCURONIUM BROMIDE 10 MG: 10 INJECTION, SOLUTION INTRAVENOUS at 11:12

## 2023-11-30 RX ADMIN — PROPOFOL 30 MG: 10 INJECTION, EMULSION INTRAVENOUS at 12:07

## 2023-11-30 RX ADMIN — SUGAMMADEX 200 MG: 100 INJECTION, SOLUTION INTRAVENOUS at 12:03

## 2023-11-30 RX ADMIN — ROCURONIUM BROMIDE 50 MG: 10 INJECTION, SOLUTION INTRAVENOUS at 10:20

## 2023-11-30 RX ADMIN — PROPOFOL 150 MG: 10 INJECTION, EMULSION INTRAVENOUS at 10:20

## 2023-11-30 RX ADMIN — SODIUM CHLORIDE, POTASSIUM CHLORIDE, SODIUM LACTATE AND CALCIUM CHLORIDE 20 ML/HR: 600; 310; 30; 20 INJECTION, SOLUTION INTRAVENOUS at 08:16

## 2023-11-30 RX ADMIN — CEFAZOLIN 2000 MG: 2 INJECTION, POWDER, FOR SOLUTION INTRAMUSCULAR; INTRAVENOUS at 10:15

## 2023-11-30 RX ADMIN — MEPERIDINE HYDROCHLORIDE 12.5 MG: 25 INJECTION INTRAMUSCULAR; INTRAVENOUS; SUBCUTANEOUS at 13:30

## 2023-11-30 RX ADMIN — LIDOCAINE HYDROCHLORIDE 0.5 ML: 10 INJECTION, SOLUTION INFILTRATION; PERINEURAL at 08:16

## 2023-11-30 RX ADMIN — ONDANSETRON 4 MG: 2 INJECTION INTRAMUSCULAR; INTRAVENOUS at 12:01

## 2023-11-30 RX ADMIN — MEPERIDINE HYDROCHLORIDE 12.5 MG: 25 INJECTION INTRAMUSCULAR; INTRAVENOUS; SUBCUTANEOUS at 13:14

## 2023-11-30 RX ADMIN — HYDROMORPHONE HYDROCHLORIDE 0.5 MG: 1 INJECTION, SOLUTION INTRAMUSCULAR; INTRAVENOUS; SUBCUTANEOUS at 12:46

## 2023-11-30 RX ADMIN — LIDOCAINE HYDROCHLORIDE 30 MG: 20 INJECTION, SOLUTION EPIDURAL; INFILTRATION; INTRACAUDAL; PERINEURAL at 10:20

## 2023-11-30 RX ADMIN — FENTANYL CITRATE 50 MCG: 50 INJECTION, SOLUTION INTRAMUSCULAR; INTRAVENOUS at 10:20

## 2023-11-30 RX ADMIN — OXYCODONE 10 MG: 5 TABLET ORAL at 13:14

## 2023-11-30 RX ADMIN — PROPOFOL 120 MCG/KG/MIN: 10 INJECTION, EMULSION INTRAVENOUS at 10:24

## 2023-11-30 RX ADMIN — Medication 100 MCG: at 12:04

## 2023-11-30 RX ADMIN — ROCURONIUM BROMIDE 10 MG: 10 INJECTION, SOLUTION INTRAVENOUS at 11:41

## 2023-11-30 NOTE — ANESTHESIA PREPROCEDURE EVALUATION
Anesthesia Evaluation     Patient summary reviewed and Nursing notes reviewed   no history of anesthetic complications:   NPO Solid Status: > 8 hours  NPO Liquid Status: > 2 hours           Airway   Mallampati: I  TM distance: >3 FB  Neck ROM: full  No difficulty expected  Dental - normal exam     Pulmonary - normal exam   (+) a smoker Current, Abstained day of surgery,  Cardiovascular - negative cardio ROS and normal exam        Neuro/Psych  (+) psychiatric history Depression  GI/Hepatic/Renal/Endo    (+) GERD, PUD    Musculoskeletal (-) negative ROS    Abdominal  - normal exam   Substance History - negative use     OB/GYN negative ob/gyn ROS         Other - negative ROS                     Anesthesia Plan    ASA 2     general     intravenous induction     Anesthetic plan, risks, benefits, and alternatives have been provided, discussed and informed consent has been obtained with: patient.    Use of blood products discussed with patient  Consented to blood products.    Plan discussed with CRNA and CAA.

## 2023-11-30 NOTE — ANESTHESIA PROCEDURE NOTES
Airway  Urgency: elective    Date/Time: 11/30/2023 10:22 AM  End Time:11/30/2023 10:22 AM  Airway not difficult    General Information and Staff    Patient location during procedure: OR  Anesthesiologist: Roxana Knowles MD  CRNA/CAA: Saumya Underwood CAA    Indications and Patient Condition  Indications for airway management: airway protection    Preoxygenated: yes  Mask difficulty assessment: 1 - vent by mask    Final Airway Details  Final airway type: endotracheal airway      Successful airway: ETT  Cuffed: yes   Successful intubation technique: video laryngoscopy  Facilitating devices/methods: intubating stylet  Endotracheal tube insertion site: oral  Blade: Tolliver  ETT size (mm): 7.0  Cormack-Lehane Classification: grade I - full view of glottis  Placement verified by: chest auscultation, capnometry and palpation of cuff   Measured from: lips  ETT/EBT  to lips (cm): 20  Number of attempts at approach: 1  Assessment: lips, teeth, and gum same as pre-op and atraumatic intubation

## 2023-11-30 NOTE — ANESTHESIA POSTPROCEDURE EVALUATION
Patient: Jolene Burnham    Procedure Summary       Date: 11/30/23 Room / Location: [facility] OR 09 / [facility] MAIN OR    Anesthesia Start: 1013 Anesthesia Stop: 1216    Procedure: Robotic assisted laparoscopic incisional hernia repair with mesh, possible open (Abdomen) Diagnosis:       Incisional hernia, without obstruction or gangrene      (Incisional hernia, without obstruction or gangrene [K43.2])    Surgeons: Joya Rubio MD Provider: Roxana Knowles MD    Anesthesia Type: general ASA Status: 2            Anesthesia Type: general    Vitals  Vitals Value Taken Time   /64 11/30/23 1223   Temp 98.5 °F (36.9 °C) 11/30/23 1216   Pulse 64 11/30/23 1227   Resp 13 11/30/23 1221   SpO2 99 % 11/30/23 1227   Vitals shown include unfiled device data.        Post Anesthesia Care and Evaluation    Patient location during evaluation: PACU  Patient participation: complete - patient participated  Level of consciousness: awake and alert  Pain management: satisfactory to patient    Airway patency: patent  Anesthetic complications: No anesthetic complications  PONV Status: none  Cardiovascular status: acceptable  Respiratory status: acceptable  Hydration status: acceptable

## 2023-11-30 NOTE — DISCHARGE INSTRUCTIONS
Call the office to schedule followup appointment approx 2 wks postop  Keep incisions dry for first 24-48 hrs then may remove overlying bandages but leave white steristrips in place  May shower soap and water after bandages are removed but no baths/soaking x 2 weeks  No lifting >10-15 lbs x 6 wks  Over the counter stool softener twice daily until off narcotics and having regular bowel movements  Milk of magnesia as needed if still constipated with stool softeners

## 2023-12-01 ENCOUNTER — TELEPHONE (OUTPATIENT)
Dept: SURGERY | Facility: CLINIC | Age: 48
End: 2023-12-01
Payer: COMMERCIAL

## 2023-12-01 DIAGNOSIS — G89.18 POSTOPERATIVE PAIN: Primary | ICD-10-CM

## 2023-12-01 RX ORDER — HYDROCODONE BITARTRATE AND ACETAMINOPHEN 10; 325 MG/1; MG/1
1 TABLET ORAL EVERY 6 HOURS PRN
Qty: 15 TABLET | Refills: 0 | Status: SHIPPED | OUTPATIENT
Start: 2023-12-01 | End: 2023-12-04

## 2023-12-01 NOTE — TELEPHONE ENCOUNTER
patient just called the clinic. states the hospital just called her for rounding calls. she told them she is having a 8/9 out of 10 pain still and she was advised to call Dr. Rubio and ask you to change or up her medication. she said her pharm she would like to use is [facility] in [place].   Robotic assisted laparoscopic incisional hernia repair with mesh   Joanne Sanford called the patient directly and changed medication. I called back and booked a post op visit with dr. Rubio

## 2023-12-01 NOTE — OP NOTE
Operative Report    Patient Name:  Jolene Burnham  YOB: 1975    Date of Surgery:  11/30/2023    Pre-op Diagnosis:   Incisional hernia    Post-op Diagnosis:   Incisional hernia    Procedure(s):  Robotic assisted laparoscopic incisional hernia repair with mesh    Staff:  Surgeon(s):  Joya Rubio MD    Circulator: Hailee Pan RN; Maria Teresa Wood RN  Scrub Person: Leonie Arzate  Assistant: Lorena Pa CSFA  was responsible for performing the following activities: Closing, Placing Dressing, and Held/Positioned Camera and their skilled assistance was necessary for the success of this case.    Anesthesia: General    Estimated Blood Loss: minimal    Implants:    Implant Name Type Inv. Item Serial No.  Lot No. LRB No. Used Action   MESH VENTRALIGHT ST CIR 4.5IN - [id number] Implant MESH VENTRALIGHT ST CIR 4.5IN  [employer]  (DIV [employer]) [id number] N/A 1 Implanted     Specimen:          None    Findings: 3 cm x 3 cm incisional hernia defect in the area of the umbilicus    Complications: None immediate    Clinical Indications:  The patient is a 48 year old female with a prior history of a laparotomy who was seen in the clinic for symptomatic incisional hernia hernia.  She presents today for repair.  The surgery along with the associated risks (including but not limited to bleeding, infection, hernia recurrence), benefits, and alternatives were explained to the patient.  The patient expressed understanding and wished to proceed with surgery.  Informed consent was obtained.     Description of Procedure:  The patient was brought to the operating room and placed in the supine position.  Bilateral sequential compression stockings placed on the lower extremities.  The patient was induced and intubated by the Anesthesia service.  Both of the patient's arms were tucked.  Perioperative antibiotics were administered.   The patient's abdomen was then prepped and draped in the usual  sterile fashion.  A time out was performed.     After confirming with anesthesia that an orogastric tube was in place and to suction we made a small stab incision in the left upper quadrant at Cm's point.  A veress needle was then carefully placed and a water drop test performed indicating we were likely intra-abdominal.  Insufflation was initiated and a low opening pressure of indicated we were intra-abdominal.  We insufflated to a pressure of 15 mmHg.  We placed an 8mm robotic trocar in the left lateral subcostal region.  We then introduced the camera and inspected the abdomen to ensure we had not caused any injuries with the placement of the trocar or veress needled and no injuries were noted.  The veress needle was removed and the insertion site was verified to be hemostatic.  Two 8mm robotic trocars were then placed under direct visualization.  One in the left lateral abdomen midway between the anterior superior iliac spine and the subcostal margin and another approximately 2 fingerbreadths medial and superior to the anterior superior iliac spine.  The table was rotated to have the patient with her left side up slightly.  The robot was then docked, a bipolar grasper was placed in the inferior port, and elen with electrocautery was placed in the superior port.     At this point I sat down at the console.  The hernia defect was clear and there were no adhesions to the anterior abdominal wall.  The defect was approximately 3 cm x 3 cm.  The hernia defect was closed in a longitudinal fashion with 0 STRATAFIX suture in a running fashion incorporating the hernia sac and being careful not to go through the skin to decreased the size of postoperative seroma.  Once the defect was closed with introduced a 4.5 inch circular mesh which would provide adequate overlap of the fascial defect in all directions.  The mesh was sutured in place circumferentially with 3-0 vlock suture.  The hernia repair was inspected and the  mesh appeared to lay flat and was well centered over the defect.  All of the needles were removed from the abdomen.     Two trocars were removed under direct visualization and were hemostatic.  The patient was leveled, pneumoperitoneum was released, and the final trocar was removed.  The incisions were all closed with 4-0 vicryl in a subcuticular fashion, cleaned, and dressed with sterile dressings.     The patient tolerated the procedure well.  She was awakened and extubated by anesthesia and then transported to the recovery room in stable condition.  At the completion of the case all needle, instrument, and sponge counts were correct.     Joya Rubio MD     Date: 11/30/2023  Time: 20:23 EST    This note was created using Dragon Voice Recognition software.

## 2023-12-04 DIAGNOSIS — G89.18 POSTOPERATIVE PAIN: ICD-10-CM

## 2023-12-04 RX ORDER — HYDROCODONE BITARTRATE AND ACETAMINOPHEN 10; 325 MG/1; MG/1
1 TABLET ORAL EVERY 6 HOURS PRN
Qty: 15 TABLET | Refills: 0 | Status: SHIPPED | OUTPATIENT
Start: 2023-12-04 | End: 2023-12-14

## 2023-12-12 ENCOUNTER — OFFICE VISIT (OUTPATIENT)
Dept: SURGERY | Facility: CLINIC | Age: 48
End: 2023-12-12
Payer: COMMERCIAL

## 2023-12-12 VITALS
BODY MASS INDEX: 25.01 KG/M2 | SYSTOLIC BLOOD PRESSURE: 101 MMHG | OXYGEN SATURATION: 93 % | HEART RATE: 102 BPM | TEMPERATURE: 98.4 F | HEIGHT: 60 IN | DIASTOLIC BLOOD PRESSURE: 67 MMHG | WEIGHT: 127.4 LBS

## 2023-12-12 DIAGNOSIS — Z09 POSTOP CHECK: Primary | ICD-10-CM

## 2023-12-12 PROCEDURE — 99024 POSTOP FOLLOW-UP VISIT: CPT | Performed by: SURGERY

## 2023-12-12 RX ORDER — BROMPHENIRAMINE MALEATE, PSEUDOEPHEDRINE HYDROCHLORIDE, AND DEXTROMETHORPHAN HYDROBROMIDE 2; 30; 10 MG/5ML; MG/5ML; MG/5ML
5 SYRUP ORAL
COMMUNITY
Start: 2023-12-09 | End: 2023-12-19

## 2023-12-12 RX ORDER — AZELASTINE 1 MG/ML
2 SPRAY, METERED NASAL
COMMUNITY
Start: 2023-12-09 | End: 2024-01-08

## 2023-12-12 RX ORDER — FEXOFENADINE HCL AND PSEUDOEPHEDRINE HCI 180; 240 MG/1; MG/1
1 TABLET, EXTENDED RELEASE ORAL DAILY
COMMUNITY
Start: 2023-12-09 | End: 2023-12-16

## 2023-12-12 RX ORDER — ONDANSETRON 4 MG/1
4 TABLET, FILM COATED ORAL
COMMUNITY
Start: 2023-12-09 | End: 2023-12-14

## 2023-12-12 RX ORDER — GUAIFENESIN 600 MG/1
600 TABLET, EXTENDED RELEASE ORAL
COMMUNITY
Start: 2023-12-09 | End: 2023-12-16

## 2023-12-12 NOTE — PROGRESS NOTES
"General Surgery Post-Operative Follow Up Note     Subjective:  Jolene Burnham is a 48 y.o. year old female here for post-operative follow up.  She reports she is doing well from a surgical standpoint.  Minimal pain.  She is tolerating a diet without any abdominal pain, nausea, vomiting.  She is having regular bowel function.    Procedure:    Robotic assisted laparoscopic incisional hernia repair with mesh, 11/30/2023    Vitals:  Ht 152.4 cm (60\")   LMP 10/16/2023 (Approximate) Comment: neg urine HCG DOS 11/30/23  BMI 25.12 kg/m²      Physical Exam:   NAD, alert  Nonlabored respirations  Abdomen soft, NT/ND, hernia repair intact, no incisional hernias appreciated, incisions healing well    Assessment and Plan:  Jolene Burnham is a 48 y.o. year old female status post robotic assisted laparoscopic incisional hernia repair with mesh, doing well.    -Continue with lifting restrictions for total of 6 weeks postop  -Followup on PRN basis    Joya Rubio M.D.  General Surgery  "

## 2023-12-12 NOTE — LETTER
December 12, 2023     Patient: Jolene Burnham   YOB: 1975   Date of Visit: 12/12/2023       To Whom it May Concern:    Jolene Burnham was seen in my clinic on 12/12/2023. She may return to work on 12/14/2023 .    If you have any questions or concerns, please don't hesitate to call.         Sincerely,          Joya Rubio MD        CC: No Recipients

## 2023-12-12 NOTE — LETTER
December 12, 2023     Patient: Jolene Burnham   YOB: 1975   Date of Visit: 12/12/2023       To Whom It May Concern:    It is my medical opinion that Jolene Burnham should remain out of work until January 8, 2024 .           Sincerely,        Joya Rubio MD    CC: No Recipients

## 2024-09-12 ENCOUNTER — OFFICE (AMBULATORY)
Age: 49
End: 2024-09-12

## 2024-09-12 ENCOUNTER — OFFICE (AMBULATORY)
Dept: URBAN - METROPOLITAN AREA CLINIC 64 | Facility: CLINIC | Age: 49
End: 2024-09-12

## 2024-09-12 VITALS
DIASTOLIC BLOOD PRESSURE: 73 MMHG | SYSTOLIC BLOOD PRESSURE: 106 MMHG | DIASTOLIC BLOOD PRESSURE: 73 MMHG | DIASTOLIC BLOOD PRESSURE: 73 MMHG | HEART RATE: 87 BPM | HEIGHT: 60 IN | HEIGHT: 60 IN | SYSTOLIC BLOOD PRESSURE: 106 MMHG | HEART RATE: 87 BPM | HEIGHT: 60 IN | WEIGHT: 127 LBS | HEIGHT: 60 IN | SYSTOLIC BLOOD PRESSURE: 106 MMHG | SYSTOLIC BLOOD PRESSURE: 106 MMHG | HEART RATE: 87 BPM | WEIGHT: 127 LBS | DIASTOLIC BLOOD PRESSURE: 73 MMHG | SYSTOLIC BLOOD PRESSURE: 106 MMHG | HEART RATE: 87 BPM | DIASTOLIC BLOOD PRESSURE: 73 MMHG | HEIGHT: 60 IN | SYSTOLIC BLOOD PRESSURE: 106 MMHG | HEART RATE: 87 BPM | WEIGHT: 127 LBS | HEIGHT: 60 IN | SYSTOLIC BLOOD PRESSURE: 106 MMHG | DIASTOLIC BLOOD PRESSURE: 73 MMHG | WEIGHT: 127 LBS | WEIGHT: 127 LBS | HEART RATE: 87 BPM | DIASTOLIC BLOOD PRESSURE: 73 MMHG | HEART RATE: 87 BPM | WEIGHT: 127 LBS | WEIGHT: 127 LBS | HEIGHT: 60 IN

## 2024-09-12 DIAGNOSIS — K58.1 IRRITABLE BOWEL SYNDROME WITH CONSTIPATION: ICD-10-CM

## 2024-09-12 DIAGNOSIS — R10.84 GENERALIZED ABDOMINAL PAIN: ICD-10-CM

## 2024-09-12 DIAGNOSIS — R11.0 NAUSEA: ICD-10-CM

## 2024-09-12 PROCEDURE — 99213 OFFICE O/P EST LOW 20 MIN: CPT | Performed by: NURSE PRACTITIONER

## 2024-09-12 RX ORDER — HYOSCYAMINE SULFATE EXTENDED-RELEASE 0.38 MG/1
0.75 TABLET ORAL
Qty: 60 | Refills: 11 | Status: ACTIVE
Start: 2024-09-12

## 2024-09-12 RX ORDER — ESOMEPRAZOLE MAGNESIUM 40 MG/1
40 CAPSULE, DELAYED RELEASE PELLETS ORAL
Qty: 90 | Refills: 3 | Status: ACTIVE
Start: 2022-09-09

## 2024-09-12 RX ORDER — PROMETHAZINE HYDROCHLORIDE 25 MG/1
TABLET ORAL
Qty: 60 | Refills: 6 | Status: ACTIVE
Start: 2023-04-18

## 2024-10-01 ENCOUNTER — OFFICE (AMBULATORY)
Dept: URBAN - METROPOLITAN AREA CLINIC 64 | Facility: CLINIC | Age: 49
End: 2024-10-01

## 2024-10-01 ENCOUNTER — OFFICE (AMBULATORY)
Age: 49
End: 2024-10-01

## 2024-10-01 DIAGNOSIS — K58.1 IRRITABLE BOWEL SYNDROME WITH CONSTIPATION: ICD-10-CM

## 2024-11-07 ENCOUNTER — HOSPITAL ENCOUNTER (EMERGENCY)
Facility: HOSPITAL | Age: 49
Discharge: HOME | End: 2024-11-07
Attending: EMERGENCY MEDICINE | Admitting: EMERGENCY MEDICINE
Payer: COMMERCIAL

## 2024-11-07 ENCOUNTER — APPOINTMENT (OUTPATIENT)
Dept: CT IMAGING | Facility: HOSPITAL | Age: 49
End: 2024-11-07
Payer: COMMERCIAL

## 2024-11-07 VITALS
RESPIRATION RATE: 16 BRPM | WEIGHT: 129.41 LBS | TEMPERATURE: 98.1 F | BODY MASS INDEX: 25.41 KG/M2 | HEART RATE: 65 BPM | OXYGEN SATURATION: 98 % | DIASTOLIC BLOOD PRESSURE: 75 MMHG | SYSTOLIC BLOOD PRESSURE: 108 MMHG | HEIGHT: 60 IN

## 2024-11-07 DIAGNOSIS — R10.9 LEFT SIDED ABDOMINAL PAIN OF UNKNOWN CAUSE: Primary | ICD-10-CM

## 2024-11-07 DIAGNOSIS — D25.2 SUBSEROUS LEIOMYOMA OF UTERUS: ICD-10-CM

## 2024-11-07 LAB
ALBUMIN SERPL-MCNC: 4.1 G/DL (ref 3.5–5.2)
ALBUMIN/GLOB SERPL: 1.1 G/DL
ALP SERPL-CCNC: 80 U/L (ref 39–117)
ALT SERPL W P-5'-P-CCNC: 10 U/L (ref 1–33)
ANION GAP SERPL CALCULATED.3IONS-SCNC: 8.6 MMOL/L (ref 5–15)
AST SERPL-CCNC: 21 U/L (ref 1–32)
BASOPHILS # BLD AUTO: 0.07 10*3/MM3 (ref 0–0.2)
BASOPHILS NFR BLD AUTO: 1 % (ref 0–1.5)
BILIRUB SERPL-MCNC: 0.3 MG/DL (ref 0–1.2)
BILIRUB UR QL STRIP: NEGATIVE
BUN SERPL-MCNC: 11 MG/DL (ref 6–20)
BUN/CREAT SERPL: 11.6 (ref 7–25)
CALCIUM SPEC-SCNC: 9.4 MG/DL (ref 8.6–10.5)
CHLORIDE SERPL-SCNC: 107 MMOL/L (ref 98–107)
CLARITY UR: CLEAR
CO2 SERPL-SCNC: 27.4 MMOL/L (ref 22–29)
COLOR UR: YELLOW
CREAT SERPL-MCNC: 0.95 MG/DL (ref 0.57–1)
DEPRECATED RDW RBC AUTO: 49.7 FL (ref 37–54)
EGFRCR SERPLBLD CKD-EPI 2021: 73.6 ML/MIN/1.73
EOSINOPHIL # BLD AUTO: 0.16 10*3/MM3 (ref 0–0.4)
EOSINOPHIL NFR BLD AUTO: 2.3 % (ref 0.3–6.2)
ERYTHROCYTE [DISTWIDTH] IN BLOOD BY AUTOMATED COUNT: 15.3 % (ref 12.3–15.4)
GLOBULIN UR ELPH-MCNC: 3.7 GM/DL
GLUCOSE SERPL-MCNC: 82 MG/DL (ref 65–99)
GLUCOSE UR STRIP-MCNC: NEGATIVE MG/DL
HCT VFR BLD AUTO: 35.4 % (ref 34–46.6)
HGB BLD-MCNC: 11.2 G/DL (ref 12–15.9)
HGB UR QL STRIP.AUTO: NEGATIVE
HOLD SPECIMEN: NORMAL
IMM GRANULOCYTES # BLD AUTO: 0.02 10*3/MM3 (ref 0–0.05)
IMM GRANULOCYTES NFR BLD AUTO: 0.3 % (ref 0–0.5)
KETONES UR QL STRIP: NEGATIVE
LEUKOCYTE ESTERASE UR QL STRIP.AUTO: NEGATIVE
LYMPHOCYTES # BLD AUTO: 2.28 10*3/MM3 (ref 0.7–3.1)
LYMPHOCYTES NFR BLD AUTO: 32.9 % (ref 19.6–45.3)
MCH RBC QN AUTO: 28.1 PG (ref 26.6–33)
MCHC RBC AUTO-ENTMCNC: 31.6 G/DL (ref 31.5–35.7)
MCV RBC AUTO: 88.7 FL (ref 79–97)
MONOCYTES # BLD AUTO: 0.52 10*3/MM3 (ref 0.1–0.9)
MONOCYTES NFR BLD AUTO: 7.5 % (ref 5–12)
NEUTROPHILS NFR BLD AUTO: 3.88 10*3/MM3 (ref 1.7–7)
NEUTROPHILS NFR BLD AUTO: 56 % (ref 42.7–76)
NITRITE UR QL STRIP: NEGATIVE
NRBC BLD AUTO-RTO: 0 /100 WBC (ref 0–0.2)
PH UR STRIP.AUTO: 6.5 [PH] (ref 5–8)
PLATELET # BLD AUTO: 254 10*3/MM3 (ref 140–450)
PMV BLD AUTO: 10.8 FL (ref 6–12)
POTASSIUM SERPL-SCNC: 4.1 MMOL/L (ref 3.5–5.2)
PROT SERPL-MCNC: 7.8 G/DL (ref 6–8.5)
PROT UR QL STRIP: NEGATIVE
RBC # BLD AUTO: 3.99 10*6/MM3 (ref 3.77–5.28)
SODIUM SERPL-SCNC: 143 MMOL/L (ref 136–145)
SP GR UR STRIP: 1.02 (ref 1–1.03)
UROBILINOGEN UR QL STRIP: NORMAL
WBC NRBC COR # BLD AUTO: 6.93 10*3/MM3 (ref 3.4–10.8)
WHOLE BLOOD HOLD COAG: NORMAL
WHOLE BLOOD HOLD SPECIMEN: NORMAL

## 2024-11-07 PROCEDURE — 80053 COMPREHEN METABOLIC PANEL: CPT | Performed by: EMERGENCY MEDICINE

## 2024-11-07 PROCEDURE — 96374 THER/PROPH/DIAG INJ IV PUSH: CPT

## 2024-11-07 PROCEDURE — 25510000001 IOPAMIDOL PER 1 ML: Performed by: EMERGENCY MEDICINE

## 2024-11-07 PROCEDURE — 99285 EMERGENCY DEPT VISIT HI MDM: CPT

## 2024-11-07 PROCEDURE — 25010000002 ONDANSETRON PER 1 MG: Performed by: EMERGENCY MEDICINE

## 2024-11-07 PROCEDURE — 25010000002 HYDROMORPHONE 1 MG/ML SOLUTION: Performed by: EMERGENCY MEDICINE

## 2024-11-07 PROCEDURE — 25010000002 KETOROLAC TROMETHAMINE PER 15 MG: Performed by: EMERGENCY MEDICINE

## 2024-11-07 PROCEDURE — 74177 CT ABD & PELVIS W/CONTRAST: CPT

## 2024-11-07 PROCEDURE — 85025 COMPLETE CBC W/AUTO DIFF WBC: CPT | Performed by: EMERGENCY MEDICINE

## 2024-11-07 PROCEDURE — 81003 URINALYSIS AUTO W/O SCOPE: CPT | Performed by: EMERGENCY MEDICINE

## 2024-11-07 PROCEDURE — 96375 TX/PRO/DX INJ NEW DRUG ADDON: CPT

## 2024-11-07 RX ORDER — TRAMADOL HYDROCHLORIDE 50 MG/1
50 TABLET ORAL EVERY 8 HOURS PRN
Qty: 10 TABLET | Refills: 0 | Status: SHIPPED | OUTPATIENT
Start: 2024-11-07

## 2024-11-07 RX ORDER — ONDANSETRON 2 MG/ML
4 INJECTION INTRAMUSCULAR; INTRAVENOUS ONCE
Status: COMPLETED | OUTPATIENT
Start: 2024-11-07 | End: 2024-11-07

## 2024-11-07 RX ORDER — IOPAMIDOL 755 MG/ML
100 INJECTION, SOLUTION INTRAVASCULAR
Status: COMPLETED | OUTPATIENT
Start: 2024-11-07 | End: 2024-11-07

## 2024-11-07 RX ORDER — SODIUM CHLORIDE 0.9 % (FLUSH) 0.9 %
10 SYRINGE (ML) INJECTION AS NEEDED
Status: DISCONTINUED | OUTPATIENT
Start: 2024-11-07 | End: 2024-11-07 | Stop reason: HOSPADM

## 2024-11-07 RX ORDER — KETOROLAC TROMETHAMINE 30 MG/ML
15 INJECTION, SOLUTION INTRAMUSCULAR; INTRAVENOUS ONCE
Status: COMPLETED | OUTPATIENT
Start: 2024-11-07 | End: 2024-11-07

## 2024-11-07 RX ADMIN — ONDANSETRON 4 MG: 2 INJECTION, SOLUTION INTRAMUSCULAR; INTRAVENOUS at 12:37

## 2024-11-07 RX ADMIN — HYDROMORPHONE HYDROCHLORIDE 0.5 MG: 1 INJECTION, SOLUTION INTRAMUSCULAR; INTRAVENOUS; SUBCUTANEOUS at 12:38

## 2024-11-07 RX ADMIN — IOPAMIDOL 100 ML: 755 INJECTION, SOLUTION INTRAVENOUS at 14:40

## 2024-11-07 RX ADMIN — KETOROLAC TROMETHAMINE 15 MG: 30 INJECTION, SOLUTION INTRAMUSCULAR at 15:01

## 2024-11-07 NOTE — ED NOTES
Today the pain woke her up at 0300, pain is more consistent than usual and it feels like someone stabbing her above left hip/LLQ.

## 2024-11-07 NOTE — ED PROVIDER NOTES
Subjective   History of Present Illness  49-year-old female presents with complaints of abdominal pain.  She states she has had this for a few weeks.  She has had no nausea vomiting.  She reports intermittent constipation diarrhea she reports no urinary difficulty.  She has had no fever no cough or shortness of breath.  Review of Systems    Past Medical History:   Diagnosis Date    Abdominal infection     Anemia     Breast mass     Colitis     Colon polyp     Fibrocystic breast     Irritable bowel syndrome     PONV (postoperative nausea and vomiting)     Sjogren's syndrome     Substance abuse     pills/ clean for 10 years       Allergies   Allergen Reactions    Penicillins Hives       Past Surgical History:   Procedure Laterality Date    APPENDECTOMY  2016    BREAST BIOPSY      Right side    BREAST SURGERY  1992     SECTION      EXPLORATORY LAPAROTOMY N/A 10/26/2019    Procedure: LAPAROTOMY EXPLORATORY  WITH RIGHT SALPINGECTOMY;  Surgeon: Jessica Taylor MD;  Location: [facility] MAIN OR;  Service: General    VENTRAL HERNIA REPAIR N/A 2023    Procedure: Robotic assisted laparoscopic incisional hernia repair with mesh, possible open;  Surgeon: Joya Rubio MD;  Location: [facility] MAIN OR;  Service: Robotics - DaVinci;  Laterality: N/A;       Family History   Problem Relation Age of Onset    Hearing loss Mother     Miscarriages / Stillbirths Mother     Cancer Maternal Grandfather     Alcohol abuse Paternal Uncle     Diabetes Sister        Social History     Socioeconomic History    Marital status:    Tobacco Use    Smoking status: Every Day     Current packs/day: 0.50     Average packs/day: 0.5 packs/day for 20.0 years (10.0 ttl pk-yrs)     Types: Cigarettes     Passive exposure: Current    Smokeless tobacco: Never   Vaping Use    Vaping status: Never Used   Substance and Sexual Activity    Alcohol use: No    Drug use: Not Currently     Types: Hydrocodone     Comment: only hx    Sexual  activity: Not Currently     Partners: Male     Birth control/protection: None     Current medication Bentyl and Lexapro      Objective   Physical Exam  49-year-old female awake alert.  Generally well-developed well-nourished.  Chest clear equal breath sounds cardiovascular regular in rhythm abdomen is soft she complains of left upper and lower abdominal tenderness.  No CVA tenderness.  Skin without rash noted.  Procedures           ED Course      Results for orders placed or performed during the hospital encounter of 11/07/24   Comprehensive Metabolic Panel    Collection Time: 11/07/24 12:34 PM    Specimen: Blood   Result Value Ref Range    Glucose 82 65 - 99 mg/dL    BUN 11 6 - 20 mg/dL    Creatinine 0.95 0.57 - 1.00 mg/dL    Sodium 143 136 - 145 mmol/L    Potassium 4.1 3.5 - 5.2 mmol/L    Chloride 107 98 - 107 mmol/L    CO2 27.4 22.0 - 29.0 mmol/L    Calcium 9.4 8.6 - 10.5 mg/dL    Total Protein 7.8 6.0 - 8.5 g/dL    Albumin 4.1 3.5 - 5.2 g/dL    ALT (SGPT) 10 1 - 33 U/L    AST (SGOT) 21 1 - 32 U/L    Alkaline Phosphatase 80 39 - 117 U/L    Total Bilirubin 0.3 0.0 - 1.2 mg/dL    Globulin 3.7 gm/dL    A/G Ratio 1.1 g/dL    BUN/Creatinine Ratio 11.6 7.0 - 25.0    Anion Gap 8.6 5.0 - 15.0 mmol/L    eGFR 73.6 >60.0 mL/min/1.73   CBC Auto Differential    Collection Time: 11/07/24 12:34 PM    Specimen: Blood   Result Value Ref Range    WBC 6.93 3.40 - 10.80 10*3/mm3    RBC 3.99 3.77 - 5.28 10*6/mm3    Hemoglobin 11.2 (L) 12.0 - 15.9 g/dL    Hematocrit 35.4 34.0 - 46.6 %    MCV 88.7 79.0 - 97.0 fL    MCH 28.1 26.6 - 33.0 pg    MCHC 31.6 31.5 - 35.7 g/dL    RDW 15.3 12.3 - 15.4 %    RDW-SD 49.7 37.0 - 54.0 fl    MPV 10.8 6.0 - 12.0 fL    Platelets 254 140 - 450 10*3/mm3    Neutrophil % 56.0 42.7 - 76.0 %    Lymphocyte % 32.9 19.6 - 45.3 %    Monocyte % 7.5 5.0 - 12.0 %    Eosinophil % 2.3 0.3 - 6.2 %    Basophil % 1.0 0.0 - 1.5 %    Immature Grans % 0.3 0.0 - 0.5 %    Neutrophils, Absolute 3.88 1.70 - 7.00 10*3/mm3     Lymphocytes, Absolute 2.28 0.70 - 3.10 10*3/mm3    Monocytes, Absolute 0.52 0.10 - 0.90 10*3/mm3    Eosinophils, Absolute 0.16 0.00 - 0.40 10*3/mm3    Basophils, Absolute 0.07 0.00 - 0.20 10*3/mm3    Immature Grans, Absolute 0.02 0.00 - 0.05 10*3/mm3    nRBC 0.0 0.0 - 0.2 /100 WBC   Lavender Top    Collection Time: 11/07/24 12:34 PM   Result Value Ref Range    Extra Tube hold for add-on    Gold Top - SST    Collection Time: 11/07/24 12:34 PM   Result Value Ref Range    Extra Tube Hold for add-ons.    Light Blue Top    Collection Time: 11/07/24 12:34 PM   Result Value Ref Range    Extra Tube Hold for add-ons.    Urinalysis With Microscopic If Indicated (No Culture) - Urine, Clean Catch    Collection Time: 11/07/24 12:35 PM    Specimen: Urine, Clean Catch   Result Value Ref Range    Color, UA Yellow Yellow, Straw    Appearance, UA Clear Clear    pH, UA 6.5 5.0 - 8.0    Specific Gravity, UA 1.017 1.005 - 1.030    Glucose, UA Negative Negative    Ketones, UA Negative Negative    Bilirubin, UA Negative Negative    Blood, UA Negative Negative    Protein, UA Negative Negative    Leuk Esterase, UA Negative Negative    Nitrite, UA Negative Negative    Urobilinogen, UA 1.0 E.U./dL 0.2 - 1.0 E.U./dL     CT Abdomen Pelvis With Contrast   Final Result      1. No acute process   2. Stable enhancing liver lesions compatible with hemangiomas   3. Nonobstructing bilateral nephrolithiasis   4. Interval enlargement of fundal subserosal uterine leiomyoma                           Electronically Signed: Ruddy Layton     11/7/2024 2:54 PM EST     Workstation ID: [id number]          Medications   sodium chloride 0.9 % flush 10 mL (has no administration in time range)   HYDROmorphone (DILAUDID) injection 0.5 mg (0.5 mg Intravenous Given 11/7/24 1238)   ondansetron (ZOFRAN) injection 4 mg (4 mg Intravenous Given 11/7/24 1237)   iopamidol (ISOVUE-370) 76 % injection 100 mL (100 mL Intravenous Given 11/7/24 1440)   ketorolac (TORADOL)  "injection 15 mg (15 mg Intravenous Given 11/7/24 1501)     BP 99/69 (Patient Position: Lying)   Pulse 57   Temp 98.1 °F (36.7 °C) (Oral)   Resp 16   Ht 152.4 cm (60\")   Wt 58.7 kg (129 lb 6.6 oz)   LMP 03/01/2024 Comment: She believes due to menopause  SpO2 95%   BMI 25.27 kg/m²                                              Medical Decision Making    Chart review: Patient had robotic assisted laparoscopic incisional hernia repair with mesh November of last year.  She also been treated for gastric ulcer September 2022.  Comorbidity: As per past history   Differential: Hernia diverticulitis splenic infarct ureterolithiasis  My EKG interpretation: Not indicated  Lab: Normal CBC comprehensive metabolic panel and urinalysis  My Radiology review and interpretation: CT scan of abdomen pelvis shows no acute intra-abdominal or pelvic abnormality.  She has normal visualization of solid organs.  There are stable enhancing liver lesions consistent with hemangioma.  There is nonobstructing bilateral renal calculi.  She does have enlargement of the fundal uterine leiomyoma now 4 cm.  There is a stable left ovarian calcification right ovary appears normal  Discussion/treatment: Patient had IV placed.  Was given Dilaudid and Zofran.  She complained of continued pain and was given ketorolac.  Findings were discussed with her and her mother.  She was discharged.  Advised to follow with gynecologist and also gastroenterologist.  Her inspect report was reviewed she was given Ultram for pain.  Patient was evaluated using appropriate PPE    Final diagnoses:   Left sided abdominal pain of unknown cause   Subserous leiomyoma of uterus       ED Disposition  ED Disposition       ED Disposition   Discharge    Condition   Stable    Comment   --               Chava Pisano MD  [address] [facility] [place] [address] [phone]               Medication List        New Prescriptions      traMADol 50 MG tablet  Commonly known " as: ULTRAM  Take 1 tablet by mouth Every 8 (Eight) Hours As Needed for Moderate Pain.               Where to Get Your Medications        These medications were sent to [facility] [address] [place] [address]      Hours: Monday to Friday 7 AM to 7 PM Phone: [phone]   traMADol 50 MG tablet            Kishor Coffman MD  11/07/24 [id number]

## 2025-07-29 ENCOUNTER — OFFICE (AMBULATORY)
Dept: URBAN - METROPOLITAN AREA CLINIC 64 | Facility: CLINIC | Age: 50
End: 2025-07-29
Payer: COMMERCIAL

## 2025-07-29 VITALS
HEART RATE: 85 BPM | DIASTOLIC BLOOD PRESSURE: 64 MMHG | SYSTOLIC BLOOD PRESSURE: 96 MMHG | HEIGHT: 60 IN | WEIGHT: 122 LBS

## 2025-07-29 DIAGNOSIS — R19.4 CHANGE IN BOWEL HABIT: ICD-10-CM

## 2025-07-29 DIAGNOSIS — K58.9 IRRITABLE BOWEL SYNDROME, UNSPECIFIED: ICD-10-CM

## 2025-07-29 DIAGNOSIS — R11.0 NAUSEA: ICD-10-CM

## 2025-07-29 DIAGNOSIS — R63.4 ABNORMAL WEIGHT LOSS: ICD-10-CM

## 2025-07-29 DIAGNOSIS — K25.3 ACUTE GASTRIC ULCER WITHOUT HEMORRHAGE OR PERFORATION: ICD-10-CM

## 2025-07-29 PROCEDURE — 99214 OFFICE O/P EST MOD 30 MIN: CPT | Performed by: NURSE PRACTITIONER

## 2025-07-29 RX ORDER — HYOSCYAMINE SULFATE 0.12 MG/1
0.38 TABLET ORAL; SUBLINGUAL
Qty: 60 | Refills: 12 | Status: ACTIVE
Start: 2025-07-29

## (undated) DEVICE — COVER,MAYO STAND,STERILE: Brand: MEDLINE

## (undated) DEVICE — BLANKT WARM UPPR/BDY ARM/OUT 57X196CM

## (undated) DEVICE — COLUMN DRAPE

## (undated) DEVICE — TUBING, SUCTION, 1/4" X 12', STRAIGHT: Brand: MEDLINE

## (undated) DEVICE — SOLUTION,WATER,IRRIGATION,1000ML,STERILE: Brand: MEDLINE

## (undated) DEVICE — TBG INSUFF MALE L/L W 12MM CON: Brand: MEDLINE INDUSTRIES, INC.

## (undated) DEVICE — ARM DRAPE

## (undated) DEVICE — DRSNG WND BORDR/ADHS NONADHR/GZ LF 4X10IN STRL

## (undated) DEVICE — BLADELESS OBTURATOR: Brand: WECK VISTA

## (undated) DEVICE — SEAL

## (undated) DEVICE — ANTIBACTERIAL UNDYED BRAIDED (POLYGLACTIN 910), SYNTHETIC ABSORBABLE SUTURE: Brand: COATED VICRYL

## (undated) DEVICE — CVR HNDL LT SURG ACCSSRY BLU STRL

## (undated) DEVICE — GOWN,REINFORCE,POLY,SIRUS,BREATH SLV,XLG: Brand: MEDLINE

## (undated) DEVICE — BLAKE SILICONE DRAIN, 19 FR ROUND, HUBLESS WITH 1/4" BENDABLE TROCAR: Brand: BLAKE

## (undated) DEVICE — SUT SILK 2/0 TIES 18IN A185H

## (undated) DEVICE — SPNG LAP PREWSH SFTPK 18X18IN STRL PK/5

## (undated) DEVICE — PK MAJ LAPAROTOMY 50

## (undated) DEVICE — GLV SURG TRIUMPH GREEN W/ALOE PF LTX 7 STRL

## (undated) DEVICE — DRN WND EVAC BULB 100CC

## (undated) DEVICE — PASS SUT PRO BARIATRIC XL W/TROC SWABS

## (undated) DEVICE — Device

## (undated) DEVICE — SPNG GZ AVANT 6PLY 4X4IN STRL PK/2

## (undated) DEVICE — SUT PERMAHAND SILK 3/0 SH1 18IN

## (undated) DEVICE — CANNULA SEAL

## (undated) DEVICE — UNDERGLV SURG BIOGEL INDICAT PF 61/2 GRN

## (undated) DEVICE — ESWAB LQ AMIES  REG. NYLON FLOCKED  APPLICATOR: Brand: ESWAB™

## (undated) DEVICE — SLV SCD CALF HEMOFORCE DVT THERP REPR LG

## (undated) DEVICE — GAUZE,SPONGE,4"X4",16PLY,XRAY,STRL,LF: Brand: MEDLINE

## (undated) DEVICE — PENCL HND ROCKRSWTCH HOLSTR EZ CLEAN TP CRD 10FT

## (undated) DEVICE — GLV SURG SENSICARE SLT PF LF 6 STRL

## (undated) DEVICE — GLV SURG BIOGEL SENSR LTX PF SZ6.5

## (undated) DEVICE — CVR HNDL LT CAM LB54

## (undated) DEVICE — TIP COVER ACCESSORY

## (undated) DEVICE — COVER,TABLE,44X90,STERILE: Brand: MEDLINE

## (undated) DEVICE — TOWEL,OR,DSP,ST,WHITE,DLX,4/PK,20PK/CS: Brand: MEDLINE

## (undated) DEVICE — SUT VIC 3/0 SH 27IN J416H

## (undated) DEVICE — SOL IRRIG NACL 9PCT 1000ML BTL

## (undated) DEVICE — REDUCER: Brand: ENDOWRIST

## (undated) DEVICE — SLV SCD CALF HEMOFORCE DVT THERP REPROC MD

## (undated) DEVICE — ENDOPATH PNEUMONEEDLE INSUFFLATION NEEDLES WITH LUER LOCK CONNECTORS 120MM: Brand: ENDOPATH

## (undated) DEVICE — ENDOPATH XCEL WITH OPTIVIEW TECHNOLOGY BLADELESS TROCARS WITH STABILITY SLEEVES: Brand: ENDOPATH XCEL OPTIVIEW

## (undated) DEVICE — SOL IRRIG H2O 1000ML STRL

## (undated) DEVICE — ELECTRD BLD EZ CLN STD 6.5IN

## (undated) DEVICE — 3M™ STERI-STRIP™ ANTIMICROBIAL SKIN CLOSURES 1/2 INCH X 4 INCHES 50/CARTON 4 CARTONS/CASE A1847: Brand: 3M™ STERI-STRIP™

## (undated) DEVICE — GLV SURG SENSICARE POLYISPRN W/ALOE PF LF 6.5 GRN STRL

## (undated) DEVICE — TP SXN YANKR BULB STRL

## (undated) DEVICE — SUT SILK 2/0 FS BLK 18IN 685G

## (undated) DEVICE — KT SURG TURNOVER 050

## (undated) DEVICE — SUT VIC 1 CTX 36IN J977H

## (undated) DEVICE — GENERAL LAPAROSCOPY CDS: Brand: MEDLINE INDUSTRIES, INC.

## (undated) DEVICE — LAPAROVUE VISIBILITY SYSTEM LAPAROSCOPIC SOLUTIONS: Brand: LAPAROVUE

## (undated) DEVICE — ENSEAL 20 CM SHAFT, LARGE JAW: Brand: ENSEAL X1

## (undated) DEVICE — PK PROC TURNOVER